# Patient Record
Sex: MALE | Race: WHITE | Employment: PART TIME | URBAN - METROPOLITAN AREA
[De-identification: names, ages, dates, MRNs, and addresses within clinical notes are randomized per-mention and may not be internally consistent; named-entity substitution may affect disease eponyms.]

---

## 2017-01-06 ENCOUNTER — LAB REQUISITION (OUTPATIENT)
Dept: LAB | Facility: HOSPITAL | Age: 72
End: 2017-01-06

## 2017-01-06 DIAGNOSIS — Z00.01 ENCOUNTER FOR GENERAL ADULT MEDICAL EXAMINATION WITH ABNORMAL FINDINGS: ICD-10-CM

## 2017-01-06 LAB
25(OH)D3 SERPL-MCNC: 28.6 NG/ML
ALBUMIN SERPL BCP-MCNC: 3.9 G/DL (ref 3.5–4.8)
ALBUMIN/GLOB SERPL: 1.3 {RATIO} (ref 1–2)
ALP SERPL-CCNC: 64 U/L (ref 32–100)
ALT SERPL-CCNC: 24 U/L (ref 17–63)
ANION GAP SERPL CALC-SCNC: 6 MMOL/L (ref 0–18)
AST SERPL-CCNC: 25 U/L (ref 15–41)
BASOPHILS # BLD: 0.1 K/UL (ref 0–0.2)
BASOPHILS NFR BLD: 1 %
BILIRUB SERPL-MCNC: 0.8 MG/DL (ref 0.3–1.2)
BUN SERPL-MCNC: 18 MG/DL (ref 8–20)
BUN/CREAT SERPL: 22.8 (ref 10–20)
CALCIUM SERPL-MCNC: 9.5 MG/DL (ref 8.5–10.5)
CHLORIDE SERPL-SCNC: 106 MMOL/L (ref 95–110)
CHOLEST SERPL-MCNC: 184 MG/DL (ref 110–200)
CO2 SERPL-SCNC: 29 MMOL/L (ref 22–32)
CREAT SERPL-MCNC: 0.79 MG/DL (ref 0.5–1.5)
EOSINOPHIL # BLD: 0.2 K/UL (ref 0–0.7)
EOSINOPHIL NFR BLD: 3 %
ERYTHROCYTE [DISTWIDTH] IN BLOOD BY AUTOMATED COUNT: 13.8 % (ref 11–15)
GLOBULIN PLAS-MCNC: 2.9 G/DL (ref 2.5–3.7)
GLUCOSE SERPL-MCNC: 109 MG/DL (ref 70–99)
HCT VFR BLD AUTO: 46.6 % (ref 41–52)
HDLC SERPL-MCNC: 55 MG/DL
HGB BLD-MCNC: 15.5 G/DL (ref 13.5–17.5)
LDLC SERPL CALC-MCNC: 118 MG/DL (ref 0–99)
LYMPHOCYTES # BLD: 1.7 K/UL (ref 1–4)
LYMPHOCYTES NFR BLD: 25 %
MCH RBC QN AUTO: 29.4 PG (ref 27–32)
MCHC RBC AUTO-ENTMCNC: 33.3 G/DL (ref 32–37)
MCV RBC AUTO: 88.1 FL (ref 80–100)
MONOCYTES # BLD: 0.6 K/UL (ref 0–1)
MONOCYTES NFR BLD: 9 %
NEUTROPHILS # BLD AUTO: 4.2 K/UL (ref 1.8–7.7)
NEUTROPHILS NFR BLD: 62 %
NONHDLC SERPL-MCNC: 129 MG/DL
OSMOLALITY UR CALC.SUM OF ELEC: 294 MOSM/KG (ref 275–295)
PLATELET # BLD AUTO: 180 K/UL (ref 140–400)
PMV BLD AUTO: 9.3 FL (ref 7.4–10.3)
POTASSIUM SERPL-SCNC: 4.5 MMOL/L (ref 3.3–5.1)
PROT SERPL-MCNC: 6.8 G/DL (ref 5.9–8.4)
PSA SERPL-MCNC: 2.6 NG/ML (ref 0–4)
RBC # BLD AUTO: 5.28 M/UL (ref 4.5–5.9)
SODIUM SERPL-SCNC: 141 MMOL/L (ref 136–144)
TRIGL SERPL-MCNC: 56 MG/DL (ref 1–149)
TSH SERPL-ACNC: 1.9 UIU/ML (ref 0.34–5.6)
WBC # BLD AUTO: 6.7 K/UL (ref 4–11)

## 2017-01-06 PROCEDURE — 84443 ASSAY THYROID STIM HORMONE: CPT | Performed by: INTERNAL MEDICINE

## 2017-01-06 PROCEDURE — 80053 COMPREHEN METABOLIC PANEL: CPT | Performed by: INTERNAL MEDICINE

## 2017-01-06 PROCEDURE — 82306 VITAMIN D 25 HYDROXY: CPT | Performed by: INTERNAL MEDICINE

## 2017-01-06 PROCEDURE — 80061 LIPID PANEL: CPT | Performed by: INTERNAL MEDICINE

## 2017-01-06 PROCEDURE — 85025 COMPLETE CBC W/AUTO DIFF WBC: CPT | Performed by: INTERNAL MEDICINE

## 2017-02-28 ENCOUNTER — HOSPITAL ENCOUNTER (OUTPATIENT)
Dept: CT IMAGING | Age: 72
Discharge: HOME | End: 2017-02-28
Attending: INTERNAL MEDICINE

## 2017-02-28 VITALS — SYSTOLIC BLOOD PRESSURE: 135 MMHG | HEART RATE: 55 BPM | DIASTOLIC BLOOD PRESSURE: 53 MMHG

## 2017-02-28 DIAGNOSIS — Z13.9 ENCOUNTER FOR SCREENING: ICD-10-CM

## 2017-02-28 NOTE — IMAGING NOTE
Cholesterol/Glucose screening performed: total cholesterol 166, HDL 51, triglycerides 80, LDL 99, fasting glucose 79. Results discussed with Mr. Helene Portillo who verbalized understanding. Preliminary results for CT Calcium Score: 4.79.   Risks and findings d

## 2017-12-22 ENCOUNTER — HOSPITAL ENCOUNTER (OUTPATIENT)
Dept: MRI IMAGING | Facility: HOSPITAL | Age: 72
Discharge: HOME | End: 2017-12-22
Attending: ORTHOPAEDIC SURGERY
Payer: COMMERCIAL

## 2017-12-22 DIAGNOSIS — M75.102 UNSPECIFIED ROTATOR CUFF TEAR OR RUPTURE OF LEFT SHOULDER, NOT SPECIFIED AS TRAUMATIC: ICD-10-CM

## 2017-12-22 DIAGNOSIS — M75.42 IMPINGEMENT SYNDROME, SHOULDER, LEFT: ICD-10-CM

## 2017-12-22 PROCEDURE — 73221 MRI JOINT UPR EXTREM W/O DYE: CPT | Performed by: ORTHOPAEDIC SURGERY

## 2018-01-06 ENCOUNTER — LAB ENCOUNTER (OUTPATIENT)
Dept: LAB | Facility: HOSPITAL | Age: 73
End: 2018-01-06
Attending: UROLOGY
Payer: COMMERCIAL

## 2018-01-06 ENCOUNTER — HOSPITAL ENCOUNTER (OUTPATIENT)
Dept: ULTRASOUND IMAGING | Facility: HOSPITAL | Age: 73
Discharge: HOME | End: 2018-01-06
Attending: UROLOGY
Payer: COMMERCIAL

## 2018-01-06 DIAGNOSIS — Z12.5 SCREENING FOR PROSTATE CANCER: ICD-10-CM

## 2018-01-06 DIAGNOSIS — N13.9 OBSTRUCTED, UROPATHY: ICD-10-CM

## 2018-01-06 DIAGNOSIS — Z00.01 ENCOUNTER FOR GENERAL ADULT MEDICAL EXAMINATION WITH ABNORMAL FINDINGS: Primary | ICD-10-CM

## 2018-01-06 LAB
ALBUMIN SERPL BCP-MCNC: 3.9 G/DL (ref 3.5–4.8)
ALBUMIN/GLOB SERPL: 1.4 {RATIO} (ref 1–2)
ALP SERPL-CCNC: 54 U/L (ref 32–100)
ALT SERPL-CCNC: 28 U/L (ref 17–63)
ANION GAP SERPL CALC-SCNC: 7 MMOL/L (ref 0–18)
AST SERPL-CCNC: 28 U/L (ref 15–41)
BASOPHILS # BLD: 0.1 K/UL (ref 0–0.2)
BASOPHILS NFR BLD: 1 %
BILIRUB SERPL-MCNC: 0.9 MG/DL (ref 0.3–1.2)
BILIRUB UR QL: NEGATIVE
BUN SERPL-MCNC: 19 MG/DL (ref 8–20)
BUN/CREAT SERPL: 18.6 (ref 10–20)
CALCIUM SERPL-MCNC: 9.5 MG/DL (ref 8.5–10.5)
CHLORIDE SERPL-SCNC: 105 MMOL/L (ref 95–110)
CHOLEST SERPL-MCNC: 182 MG/DL (ref 110–200)
CLARITY UR: CLEAR
CO2 SERPL-SCNC: 28 MMOL/L (ref 22–32)
COLOR UR: YELLOW
CREAT SERPL-MCNC: 1.02 MG/DL (ref 0.5–1.5)
EOSINOPHIL # BLD: 0.1 K/UL (ref 0–0.7)
EOSINOPHIL NFR BLD: 2 %
ERYTHROCYTE [DISTWIDTH] IN BLOOD BY AUTOMATED COUNT: 14.1 % (ref 11–15)
GLOBULIN PLAS-MCNC: 2.8 G/DL (ref 2.5–3.7)
GLUCOSE SERPL-MCNC: 94 MG/DL (ref 70–99)
GLUCOSE UR-MCNC: NEGATIVE MG/DL
HBA1C MFR BLD: 5.6 % (ref 4–6)
HCT VFR BLD AUTO: 46.4 % (ref 41–52)
HDLC SERPL-MCNC: 54 MG/DL
HGB BLD-MCNC: 15.5 G/DL (ref 13.5–17.5)
HGB UR QL STRIP.AUTO: NEGATIVE
KETONES UR-MCNC: NEGATIVE MG/DL
LDLC SERPL CALC-MCNC: 109 MG/DL (ref 0–99)
LEUKOCYTE ESTERASE UR QL STRIP.AUTO: NEGATIVE
LYMPHOCYTES # BLD: 1.7 K/UL (ref 1–4)
LYMPHOCYTES NFR BLD: 27 %
MCH RBC QN AUTO: 29.5 PG (ref 27–32)
MCHC RBC AUTO-ENTMCNC: 33.4 G/DL (ref 32–37)
MCV RBC AUTO: 88.1 FL (ref 80–100)
MONOCYTES # BLD: 0.5 K/UL (ref 0–1)
MONOCYTES NFR BLD: 8 %
NEUTROPHILS # BLD AUTO: 3.9 K/UL (ref 1.8–7.7)
NEUTROPHILS NFR BLD: 62 %
NITRITE UR QL STRIP.AUTO: NEGATIVE
NONHDLC SERPL-MCNC: 128 MG/DL
OSMOLALITY UR CALC.SUM OF ELEC: 292 MOSM/KG (ref 275–295)
PH UR: 6 [PH] (ref 5–8)
PLATELET # BLD AUTO: 171 K/UL (ref 140–400)
PMV BLD AUTO: 9.3 FL (ref 7.4–10.3)
POTASSIUM SERPL-SCNC: 4.5 MMOL/L (ref 3.3–5.1)
PROT SERPL-MCNC: 6.7 G/DL (ref 5.9–8.4)
PROT UR-MCNC: NEGATIVE MG/DL
PSA SERPL-MCNC: 3.9 NG/ML (ref 0–4)
RBC # BLD AUTO: 5.26 M/UL (ref 4.5–5.9)
SODIUM SERPL-SCNC: 140 MMOL/L (ref 136–144)
SP GR UR STRIP: 1.01 (ref 1–1.03)
TRIGL SERPL-MCNC: 95 MG/DL (ref 1–149)
TSH SERPL-ACNC: 1.51 UIU/ML (ref 0.45–5.33)
UROBILINOGEN UR STRIP-ACNC: <2
VIT C UR-MCNC: NEGATIVE MG/DL
WBC # BLD AUTO: 6.3 K/UL (ref 4–11)

## 2018-01-06 PROCEDURE — 80053 COMPREHEN METABOLIC PANEL: CPT

## 2018-01-06 PROCEDURE — 80061 LIPID PANEL: CPT

## 2018-01-06 PROCEDURE — 85025 COMPLETE CBC W/AUTO DIFF WBC: CPT

## 2018-01-06 PROCEDURE — 84443 ASSAY THYROID STIM HORMONE: CPT

## 2018-01-06 PROCEDURE — 81003 URINALYSIS AUTO W/O SCOPE: CPT

## 2018-01-06 PROCEDURE — 83036 HEMOGLOBIN GLYCOSYLATED A1C: CPT

## 2018-01-06 PROCEDURE — 82306 VITAMIN D 25 HYDROXY: CPT

## 2018-01-06 PROCEDURE — 36415 COLL VENOUS BLD VENIPUNCTURE: CPT

## 2018-01-06 PROCEDURE — 86803 HEPATITIS C AB TEST: CPT

## 2018-01-06 PROCEDURE — 76857 US EXAM PELVIC LIMITED: CPT | Performed by: UROLOGY

## 2018-01-08 LAB — HCV AB SERPL QL IA: NONREACTIVE

## 2018-01-09 LAB — 25(OH)D3 SERPL-MCNC: 42.6 NG/ML

## 2018-02-05 ENCOUNTER — HOSPITAL ENCOUNTER (OUTPATIENT)
Dept: MAMMOGRAPHY | Facility: HOSPITAL | Age: 73
Discharge: HOME | End: 2018-02-05
Attending: INTERNAL MEDICINE
Payer: COMMERCIAL

## 2018-02-05 ENCOUNTER — HOSPITAL ENCOUNTER (OUTPATIENT)
Dept: ULTRASOUND IMAGING | Facility: HOSPITAL | Age: 73
Discharge: HOME | End: 2018-02-05
Attending: INTERNAL MEDICINE
Payer: COMMERCIAL

## 2018-02-05 DIAGNOSIS — N63.20 LEFT BREAST LUMP: ICD-10-CM

## 2018-02-05 PROCEDURE — 76642 ULTRASOUND BREAST LIMITED: CPT | Performed by: INTERNAL MEDICINE

## 2018-02-05 PROCEDURE — 77066 DX MAMMO INCL CAD BI: CPT | Performed by: INTERNAL MEDICINE

## 2019-01-17 ENCOUNTER — LAB ENCOUNTER (OUTPATIENT)
Dept: LAB | Facility: HOSPITAL | Age: 74
End: 2019-01-17
Attending: INTERNAL MEDICINE
Payer: COMMERCIAL

## 2019-01-17 DIAGNOSIS — E78.00 PURE HYPERCHOLESTEROLEMIA: ICD-10-CM

## 2019-01-17 DIAGNOSIS — R73.01 IMPAIRED FASTING GLUCOSE: ICD-10-CM

## 2019-01-17 DIAGNOSIS — Z12.5 ENCOUNTER FOR SCREENING FOR MALIGNANT NEOPLASM OF PROSTATE: ICD-10-CM

## 2019-01-17 DIAGNOSIS — Z00.00 ENCOUNTER FOR GENERAL ADULT MEDICAL EXAMINATION W/O ABNORMAL FINDINGS: Primary | ICD-10-CM

## 2019-01-17 LAB
ALBUMIN SERPL BCP-MCNC: 3.9 G/DL (ref 3.5–4.8)
ALBUMIN/GLOB SERPL: 1.3 {RATIO} (ref 1–2)
ALP SERPL-CCNC: 62 U/L (ref 32–100)
ALT SERPL-CCNC: 33 U/L (ref 17–63)
ANION GAP SERPL CALC-SCNC: 9 MMOL/L (ref 0–18)
AST SERPL-CCNC: 33 U/L (ref 15–41)
BASOPHILS # BLD: 0 K/UL (ref 0–0.2)
BASOPHILS NFR BLD: 1 %
BILIRUB SERPL-MCNC: 0.7 MG/DL (ref 0.3–1.2)
BILIRUB UR QL: NEGATIVE
BUN SERPL-MCNC: 19 MG/DL (ref 8–20)
BUN/CREAT SERPL: 20.7 (ref 10–20)
CALCIUM SERPL-MCNC: 9.7 MG/DL (ref 8.5–10.5)
CHLORIDE SERPL-SCNC: 103 MMOL/L (ref 95–110)
CHOLEST SERPL-MCNC: 177 MG/DL (ref 110–200)
CLARITY UR: CLEAR
CO2 SERPL-SCNC: 26 MMOL/L (ref 22–32)
COLOR UR: YELLOW
COMPLEXED PSA SERPL-MCNC: 4.53 NG/ML (ref 0.01–4)
CREAT SERPL-MCNC: 0.92 MG/DL (ref 0.5–1.5)
CREAT UR-MCNC: 37.8 MG/DL
EOSINOPHIL # BLD: 0.2 K/UL (ref 0–0.7)
EOSINOPHIL NFR BLD: 3 %
ERYTHROCYTE [DISTWIDTH] IN BLOOD BY AUTOMATED COUNT: 13.9 % (ref 11–15)
EST. AVERAGE GLUCOSE BLD GHB EST-MCNC: 111 MG/DL (ref 68–126)
GLOBULIN PLAS-MCNC: 3.1 G/DL (ref 2.5–3.7)
GLUCOSE SERPL-MCNC: 94 MG/DL (ref 70–99)
GLUCOSE UR-MCNC: NEGATIVE MG/DL
HBA1C MFR BLD HPLC: 5.5 % (ref ?–5.7)
HCT VFR BLD AUTO: 46.8 % (ref 41–52)
HDLC SERPL-MCNC: 65 MG/DL
HGB BLD-MCNC: 15.9 G/DL (ref 13.5–17.5)
HGB UR QL STRIP.AUTO: NEGATIVE
KETONES UR-MCNC: NEGATIVE MG/DL
LDLC SERPL CALC-MCNC: 97 MG/DL (ref 0–99)
LEUKOCYTE ESTERASE UR QL STRIP.AUTO: NEGATIVE
LYMPHOCYTES # BLD: 1.6 K/UL (ref 1–4)
LYMPHOCYTES NFR BLD: 27 %
MCH RBC QN AUTO: 29.8 PG (ref 27–32)
MCHC RBC AUTO-ENTMCNC: 33.9 G/DL (ref 32–37)
MCV RBC AUTO: 87.9 FL (ref 80–100)
MICROALBUMIN UR-MCNC: 0.5 MG/DL (ref 0–1.8)
MICROALBUMIN/CREAT UR: 13.2 MG/G{CREAT} (ref 0–20)
MONOCYTES # BLD: 0.5 K/UL (ref 0–1)
MONOCYTES NFR BLD: 9 %
NEUTROPHILS # BLD AUTO: 3.7 K/UL (ref 1.8–7.7)
NEUTROPHILS NFR BLD: 62 %
NITRITE UR QL STRIP.AUTO: NEGATIVE
NONHDLC SERPL-MCNC: 112 MG/DL
OSMOLALITY UR CALC.SUM OF ELEC: 288 MOSM/KG (ref 275–295)
PATIENT FASTING: YES
PH UR: 6 [PH] (ref 5–8)
PLATELET # BLD AUTO: 194 K/UL (ref 140–400)
PMV BLD AUTO: 9.3 FL (ref 7.4–10.3)
POTASSIUM SERPL-SCNC: 4.4 MMOL/L (ref 3.3–5.1)
PROT SERPL-MCNC: 7 G/DL (ref 5.9–8.4)
PROT UR-MCNC: NEGATIVE MG/DL
PSA SERPL-MCNC: 4 NG/ML (ref 0–4)
RBC # BLD AUTO: 5.32 M/UL (ref 4.5–5.9)
SODIUM SERPL-SCNC: 138 MMOL/L (ref 136–144)
SP GR UR STRIP: 1.01 (ref 1–1.03)
TRIGL SERPL-MCNC: 73 MG/DL (ref 1–149)
TSH SERPL-ACNC: 1.54 UIU/ML (ref 0.45–5.33)
UROBILINOGEN UR STRIP-ACNC: <2
VIT C UR-MCNC: NEGATIVE MG/DL
WBC # BLD AUTO: 6 K/UL (ref 4–11)

## 2019-01-17 PROCEDURE — 81003 URINALYSIS AUTO W/O SCOPE: CPT

## 2019-01-17 PROCEDURE — 85025 COMPLETE CBC W/AUTO DIFF WBC: CPT

## 2019-01-17 PROCEDURE — 82570 ASSAY OF URINE CREATININE: CPT

## 2019-01-17 PROCEDURE — 80053 COMPREHEN METABOLIC PANEL: CPT

## 2019-01-17 PROCEDURE — 36415 COLL VENOUS BLD VENIPUNCTURE: CPT

## 2019-01-17 PROCEDURE — 83036 HEMOGLOBIN GLYCOSYLATED A1C: CPT

## 2019-01-17 PROCEDURE — 80061 LIPID PANEL: CPT

## 2019-01-17 PROCEDURE — 84443 ASSAY THYROID STIM HORMONE: CPT

## 2019-01-17 PROCEDURE — 82043 UR ALBUMIN QUANTITATIVE: CPT

## 2019-07-08 ENCOUNTER — HOSPITAL ENCOUNTER (OUTPATIENT)
Age: 74
Discharge: HOME | End: 2019-07-08
Attending: EMERGENCY MEDICINE
Payer: COMMERCIAL

## 2019-07-08 VITALS
WEIGHT: 253 LBS | TEMPERATURE: 98 F | SYSTOLIC BLOOD PRESSURE: 146 MMHG | BODY MASS INDEX: 42 KG/M2 | RESPIRATION RATE: 18 BRPM | DIASTOLIC BLOOD PRESSURE: 82 MMHG | OXYGEN SATURATION: 95 % | HEART RATE: 56 BPM

## 2019-07-08 DIAGNOSIS — T15.91XA: Primary | ICD-10-CM

## 2019-07-08 PROCEDURE — 99213 OFFICE O/P EST LOW 20 MIN: CPT

## 2019-07-08 PROCEDURE — 99203 OFFICE O/P NEW LOW 30 MIN: CPT

## 2019-07-08 RX ORDER — ERYTHROMYCIN 5 MG/G
1 OINTMENT OPHTHALMIC EVERY 6 HOURS
Qty: 1 G | Refills: 0 | Status: SHIPPED | OUTPATIENT
Start: 2019-07-08 | End: 2019-07-15

## 2019-07-09 NOTE — ED PROVIDER NOTES
Patient Seen in: [facility] In [address] [place] [address]    History   Patient presents with: Eye Visual Problem (opthalmic)    Stated Complaint: rt eye FB    HPI    Pt complains of righteye pain, redness for 1 days. Pain is stinging,.   Hurts to bli Packs/day: 1.00        Years: 30.00        Pack years: 27        Quit date: 6/15/1989        Years since quittin.0      Smokeless tobacco: Never Used    Alcohol use:  Yes      Alcohol/week: 1.5 oz      Types: 3 Glasses of wine per week    Drug use: Not

## 2019-07-09 NOTE — ED INITIAL ASSESSMENT (HPI)
Possible wood saw dust in the right eye. Pt was working in his basement and was blowing the sawdust.  Pt states this occurred two days ago. Pt washed out his eye, but still feels like there is something in his right eye.

## 2019-07-16 ENCOUNTER — HOSPITAL ENCOUNTER (OUTPATIENT)
Dept: ULTRASOUND IMAGING | Facility: HOSPITAL | Age: 74
Discharge: HOME | End: 2019-07-16
Attending: UROLOGY
Payer: COMMERCIAL

## 2019-07-16 ENCOUNTER — LAB ENCOUNTER (OUTPATIENT)
Dept: LAB | Facility: HOSPITAL | Age: 74
End: 2019-07-16
Attending: UROLOGY
Payer: COMMERCIAL

## 2019-07-16 DIAGNOSIS — N13.9 ACUTE UNILATERAL OBSTRUCTIVE UROPATHY: Primary | ICD-10-CM

## 2019-07-16 DIAGNOSIS — R97.20 ELEVATED PROSTATE SPECIFIC ANTIGEN (PSA): ICD-10-CM

## 2019-07-16 DIAGNOSIS — R97.20 ELEVATED PSA: ICD-10-CM

## 2019-07-16 DIAGNOSIS — N13.9 OBSTRUCTIVE UROPATHY: ICD-10-CM

## 2019-07-16 LAB — PSA SERPL-MCNC: 2.38 NG/ML (ref ?–4)

## 2019-07-16 PROCEDURE — 36415 COLL VENOUS BLD VENIPUNCTURE: CPT

## 2019-07-16 PROCEDURE — 76857 US EXAM PELVIC LIMITED: CPT | Performed by: UROLOGY

## 2019-07-16 PROCEDURE — 84153 ASSAY OF PSA TOTAL: CPT

## 2019-10-28 ENCOUNTER — HOSPITAL ENCOUNTER (OUTPATIENT)
Dept: GENERAL RADIOLOGY | Facility: HOSPITAL | Age: 74
Discharge: HOME | End: 2019-10-28
Attending: INTERNAL MEDICINE
Payer: COMMERCIAL

## 2019-10-28 ENCOUNTER — LAB ENCOUNTER (OUTPATIENT)
Dept: LAB | Facility: HOSPITAL | Age: 74
End: 2019-10-28
Attending: INTERNAL MEDICINE
Payer: COMMERCIAL

## 2019-10-28 DIAGNOSIS — M25.531 PAIN IN BOTH WRISTS: ICD-10-CM

## 2019-10-28 DIAGNOSIS — M79.641 HAND PAIN, RIGHT: ICD-10-CM

## 2019-10-28 DIAGNOSIS — M25.539 WRIST PAIN: ICD-10-CM

## 2019-10-28 DIAGNOSIS — M25.532 PAIN IN BOTH WRISTS: ICD-10-CM

## 2019-10-28 DIAGNOSIS — M79.642 PAIN IN BOTH HANDS: Primary | ICD-10-CM

## 2019-10-28 DIAGNOSIS — M79.641 PAIN IN BOTH HANDS: Primary | ICD-10-CM

## 2019-10-28 DIAGNOSIS — M79.642 HAND PAIN, LEFT: ICD-10-CM

## 2019-10-28 DIAGNOSIS — M25.531 WRIST PAIN, RIGHT: ICD-10-CM

## 2019-10-28 PROCEDURE — 73130 X-RAY EXAM OF HAND: CPT | Performed by: INTERNAL MEDICINE

## 2019-10-28 PROCEDURE — 86038 ANTINUCLEAR ANTIBODIES: CPT

## 2019-10-28 PROCEDURE — 73110 X-RAY EXAM OF WRIST: CPT | Performed by: INTERNAL MEDICINE

## 2019-10-28 PROCEDURE — 86431 RHEUMATOID FACTOR QUANT: CPT

## 2019-10-28 PROCEDURE — 86140 C-REACTIVE PROTEIN: CPT

## 2019-10-28 PROCEDURE — 86039 ANTINUCLEAR ANTIBODIES (ANA): CPT

## 2019-10-28 PROCEDURE — 36415 COLL VENOUS BLD VENIPUNCTURE: CPT

## 2019-10-28 PROCEDURE — 86235 NUCLEAR ANTIGEN ANTIBODY: CPT

## 2019-10-28 PROCEDURE — 85652 RBC SED RATE AUTOMATED: CPT

## 2019-10-28 PROCEDURE — 84550 ASSAY OF BLOOD/URIC ACID: CPT

## 2019-10-28 PROCEDURE — 86225 DNA ANTIBODY NATIVE: CPT

## 2019-10-28 PROCEDURE — 86200 CCP ANTIBODY: CPT

## 2020-01-06 ENCOUNTER — LAB ENCOUNTER (OUTPATIENT)
Dept: LAB | Age: 75
End: 2020-01-06
Attending: INTERNAL MEDICINE
Payer: COMMERCIAL

## 2020-01-06 DIAGNOSIS — Z00.01 ENCOUNTER FOR GENERAL ADULT MEDICAL EXAMINATION WITH ABNORMAL FINDINGS: Primary | ICD-10-CM

## 2020-01-06 DIAGNOSIS — R97.20 ELEVATED PROSTATE SPECIFIC ANTIGEN (PSA): ICD-10-CM

## 2020-01-06 LAB
ALBUMIN SERPL-MCNC: 3.8 G/DL (ref 3.4–5)
ALBUMIN/GLOB SERPL: 1.2 {RATIO} (ref 1–2)
ALP LIVER SERPL-CCNC: 74 U/L (ref 45–117)
ALT SERPL-CCNC: 36 U/L (ref 16–61)
ANION GAP SERPL CALC-SCNC: 6 MMOL/L (ref 0–18)
AST SERPL-CCNC: 35 U/L (ref 15–37)
BASOPHILS # BLD AUTO: 0.09 X10(3) UL (ref 0–0.2)
BASOPHILS NFR BLD AUTO: 1.3 %
BILIRUB SERPL-MCNC: 0.6 MG/DL (ref 0.1–2)
BILIRUB UR QL: NEGATIVE
BUN BLD-MCNC: 15 MG/DL (ref 7–18)
BUN/CREAT SERPL: 13.8 (ref 10–20)
CALCIUM BLD-MCNC: 9.9 MG/DL (ref 8.5–10.1)
CHLORIDE SERPL-SCNC: 105 MMOL/L (ref 98–112)
CHOLEST SMN-MCNC: 160 MG/DL (ref ?–200)
CLARITY UR: CLEAR
CO2 SERPL-SCNC: 30 MMOL/L (ref 21–32)
COLOR UR: YELLOW
CREAT BLD-MCNC: 1.09 MG/DL (ref 0.7–1.3)
DEPRECATED RDW RBC AUTO: 42.7 FL (ref 35.1–46.3)
EOSINOPHIL # BLD AUTO: 0.26 X10(3) UL (ref 0–0.7)
EOSINOPHIL NFR BLD AUTO: 3.8 %
ERYTHROCYTE [DISTWIDTH] IN BLOOD BY AUTOMATED COUNT: 12.9 % (ref 11–15)
EST. AVERAGE GLUCOSE BLD GHB EST-MCNC: 111 MG/DL (ref 68–126)
GLOBULIN PLAS-MCNC: 3.1 G/DL (ref 2.8–4.4)
GLUCOSE BLD-MCNC: 87 MG/DL (ref 70–99)
GLUCOSE UR-MCNC: NEGATIVE MG/DL
HBA1C MFR BLD HPLC: 5.5 % (ref ?–5.7)
HCT VFR BLD AUTO: 47.7 % (ref 39–53)
HDLC SERPL-MCNC: 45 MG/DL (ref 40–59)
HGB BLD-MCNC: 15.4 G/DL (ref 13–17.5)
HGB UR QL STRIP.AUTO: NEGATIVE
IMM GRANULOCYTES # BLD AUTO: 0.01 X10(3) UL (ref 0–1)
IMM GRANULOCYTES NFR BLD: 0.1 %
KETONES UR-MCNC: NEGATIVE MG/DL
LDLC SERPL CALC-MCNC: 100 MG/DL (ref ?–100)
LEUKOCYTE ESTERASE UR QL STRIP.AUTO: NEGATIVE
LYMPHOCYTES # BLD AUTO: 1.68 X10(3) UL (ref 1–4)
LYMPHOCYTES NFR BLD AUTO: 24.5 %
M PROTEIN MFR SERPL ELPH: 6.9 G/DL (ref 6.4–8.2)
MCH RBC QN AUTO: 29.4 PG (ref 26–34)
MCHC RBC AUTO-ENTMCNC: 32.3 G/DL (ref 31–37)
MCV RBC AUTO: 91 FL (ref 80–100)
MONOCYTES # BLD AUTO: 0.52 X10(3) UL (ref 0.1–1)
MONOCYTES NFR BLD AUTO: 7.6 %
NEUTROPHILS # BLD AUTO: 4.31 X10 (3) UL (ref 1.5–7.7)
NEUTROPHILS # BLD AUTO: 4.31 X10(3) UL (ref 1.5–7.7)
NEUTROPHILS NFR BLD AUTO: 62.7 %
NITRITE UR QL STRIP.AUTO: NEGATIVE
NONHDLC SERPL-MCNC: 115 MG/DL (ref ?–130)
OSMOLALITY SERPL CALC.SUM OF ELEC: 292 MOSM/KG (ref 275–295)
PATIENT FASTING Y/N/NP: YES
PATIENT FASTING Y/N/NP: YES
PH UR: 7 [PH] (ref 5–8)
PLATELET # BLD AUTO: 202 10(3)UL (ref 150–450)
POTASSIUM SERPL-SCNC: 4.7 MMOL/L (ref 3.5–5.1)
PROT UR-MCNC: NEGATIVE MG/DL
PSA FREE MFR SERPL: 13 %
PSA FREE SERPL-MCNC: 0.27 NG/ML
PSA SERPL-MCNC: 2.01 NG/ML (ref ?–4)
RBC # BLD AUTO: 5.24 X10(6)UL (ref 3.8–5.8)
SODIUM SERPL-SCNC: 141 MMOL/L (ref 136–145)
SP GR UR STRIP: 1.01 (ref 1–1.03)
TRIGL SERPL-MCNC: 74 MG/DL (ref 30–149)
TSI SER-ACNC: 2.28 MIU/ML (ref 0.36–3.74)
UROBILINOGEN UR STRIP-ACNC: <2
VLDLC SERPL CALC-MCNC: 15 MG/DL (ref 0–30)
WBC # BLD AUTO: 6.9 X10(3) UL (ref 4–11)

## 2020-01-06 PROCEDURE — 80053 COMPREHEN METABOLIC PANEL: CPT

## 2020-01-06 PROCEDURE — 84154 ASSAY OF PSA FREE: CPT

## 2020-01-06 PROCEDURE — 85025 COMPLETE CBC W/AUTO DIFF WBC: CPT

## 2020-01-06 PROCEDURE — 36415 COLL VENOUS BLD VENIPUNCTURE: CPT

## 2020-01-06 PROCEDURE — 83036 HEMOGLOBIN GLYCOSYLATED A1C: CPT

## 2020-01-06 PROCEDURE — 84153 ASSAY OF PSA TOTAL: CPT

## 2020-01-06 PROCEDURE — 80061 LIPID PANEL: CPT

## 2020-01-06 PROCEDURE — 81003 URINALYSIS AUTO W/O SCOPE: CPT

## 2020-01-06 PROCEDURE — 84443 ASSAY THYROID STIM HORMONE: CPT

## 2020-07-20 ENCOUNTER — OFFICE VISIT (OUTPATIENT)
Dept: RHEUMATOLOGY | Facility: CLINIC | Age: 75
End: 2020-07-20
Payer: COMMERCIAL

## 2020-07-20 VITALS
TEMPERATURE: 98 F | BODY MASS INDEX: 41.48 KG/M2 | HEIGHT: 65 IN | SYSTOLIC BLOOD PRESSURE: 128 MMHG | WEIGHT: 249 LBS | DIASTOLIC BLOOD PRESSURE: 72 MMHG | RESPIRATION RATE: 20 BRPM | HEART RATE: 64 BPM

## 2020-07-20 DIAGNOSIS — M19.041 OSTEOARTHRITIS OF FINGERS OF BOTH HANDS: Primary | ICD-10-CM

## 2020-07-20 DIAGNOSIS — M11.262 CHONDROCALCINOSIS OF BOTH KNEES: ICD-10-CM

## 2020-07-20 DIAGNOSIS — Z98.890 STATUS POST LUMBAR LAMINECTOMY: ICD-10-CM

## 2020-07-20 DIAGNOSIS — M11.261 CHONDROCALCINOSIS OF BOTH KNEES: ICD-10-CM

## 2020-07-20 DIAGNOSIS — M19.042 OSTEOARTHRITIS OF FINGERS OF BOTH HANDS: Primary | ICD-10-CM

## 2020-07-20 DIAGNOSIS — E66.01 CLASS 3 SEVERE OBESITY DUE TO EXCESS CALORIES WITHOUT SERIOUS COMORBIDITY WITH BODY MASS INDEX (BMI) OF 40.0 TO 44.9 IN ADULT (HCC): ICD-10-CM

## 2020-07-20 DIAGNOSIS — Z87.39 H/O CALCIUM PYROPHOSPHATE DEPOSITION DISEASE (CPPD): ICD-10-CM

## 2020-07-20 DIAGNOSIS — M18.0 OSTEOARTHRITIS OF CARPOMETACARPAL (CMC) JOINT OF BOTH THUMBS: ICD-10-CM

## 2020-07-20 PROBLEM — E66.813 CLASS 3 SEVERE OBESITY DUE TO EXCESS CALORIES WITHOUT SERIOUS COMORBIDITY WITH BODY MASS INDEX (BMI) OF 40.0 TO 44.9 IN ADULT: Status: ACTIVE | Noted: 2020-07-20

## 2020-07-20 PROBLEM — E66.813 CLASS 3 SEVERE OBESITY DUE TO EXCESS CALORIES WITHOUT SERIOUS COMORBIDITY WITH BODY MASS INDEX (BMI) OF 40.0 TO 44.9 IN ADULT (HCC): Status: ACTIVE | Noted: 2020-07-20

## 2020-07-20 PROCEDURE — 99204 OFFICE O/P NEW MOD 45 MIN: CPT | Performed by: INTERNAL MEDICINE

## 2020-07-20 PROCEDURE — 3008F BODY MASS INDEX DOCD: CPT | Performed by: INTERNAL MEDICINE

## 2020-07-20 PROCEDURE — 3074F SYST BP LT 130 MM HG: CPT | Performed by: INTERNAL MEDICINE

## 2020-07-20 PROCEDURE — 3078F DIAST BP <80 MM HG: CPT | Performed by: INTERNAL MEDICINE

## 2020-07-20 RX ORDER — COLCHICINE 0.6 MG/1
0.6 TABLET ORAL 2 TIMES DAILY
Qty: 180 TABLET | Refills: 3 | Status: SHIPPED | OUTPATIENT
Start: 2020-07-20 | End: 2021-01-06

## 2020-07-20 RX ORDER — COLCHICINE 0.6 MG/1
0.6 TABLET ORAL 2 TIMES DAILY
COMMUNITY
Start: 2020-02-26 | End: 2021-01-06

## 2020-07-20 NOTE — PATIENT INSTRUCTIONS
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 CPPD disease stands for calcium pyrophosphate deposition disease, calcium crystals, crystals that causes pseudogout.   This is a pain

## 2020-07-20 NOTE — PROGRESS NOTES
[facility]  Report of Consultation    Nieves Clay Patient Status:  No patient class for patient encounter    1945 MRN [id number]   Location [address] [place] [address] PCP Katelyn Johnston MD     Date of Consult:  20    Reason for Consulta tablet (0.6 mg total) by mouth 2 (two) times daily. , Disp: 180 tablet, Rfl: 3  •  Finasteride (PROSCAR OR), Take by mouth., Disp: , Rfl:   •  triamcinolone acetonide 0.1 % External Cream, Apply topically 2 (two) times daily. , Disp: , Rfl:   •  Vitamin D3 2 limits. Laboratory Data: 10/28/2012 HEATH screen positive. C-reactive protein slightly +0.66 normal is less than 0.3. Rheumatoid factor negative. Uric acid 8.6. CCP antibody negative. PSA 2.38.     Imagin2019 x-ray wrists/hands bilaterally sh for calcium pyrophosphate deposition disease, calcium crystals, crystals that causes pseudogout. This is a painful condition that can hit the wrists and knees and cause severe pain. Colchicine generally helps this.   Take colchicine 1 a day as a [place]

## 2021-01-06 ENCOUNTER — OFFICE VISIT (OUTPATIENT)
Dept: RHEUMATOLOGY | Facility: CLINIC | Age: 76
End: 2021-01-06
Payer: COMMERCIAL

## 2021-01-06 VITALS
DIASTOLIC BLOOD PRESSURE: 84 MMHG | SYSTOLIC BLOOD PRESSURE: 128 MMHG | BODY MASS INDEX: 41.32 KG/M2 | WEIGHT: 248 LBS | HEIGHT: 65 IN | TEMPERATURE: 97 F | HEART RATE: 68 BPM | RESPIRATION RATE: 18 BRPM

## 2021-01-06 DIAGNOSIS — Z98.890 STATUS POST LUMBAR LAMINECTOMY: ICD-10-CM

## 2021-01-06 DIAGNOSIS — M19.042 OSTEOARTHRITIS OF FINGERS OF BOTH HANDS: ICD-10-CM

## 2021-01-06 DIAGNOSIS — Z98.1 HISTORY OF FUSION OF CERVICAL SPINE: ICD-10-CM

## 2021-01-06 DIAGNOSIS — M18.0 OSTEOARTHRITIS OF CARPOMETACARPAL (CMC) JOINT OF BOTH THUMBS: Primary | ICD-10-CM

## 2021-01-06 DIAGNOSIS — Z87.39 H/O CALCIUM PYROPHOSPHATE DEPOSITION DISEASE (CPPD): ICD-10-CM

## 2021-01-06 DIAGNOSIS — M19.041 OSTEOARTHRITIS OF FINGERS OF BOTH HANDS: ICD-10-CM

## 2021-01-06 PROCEDURE — 3074F SYST BP LT 130 MM HG: CPT | Performed by: INTERNAL MEDICINE

## 2021-01-06 PROCEDURE — 3008F BODY MASS INDEX DOCD: CPT | Performed by: INTERNAL MEDICINE

## 2021-01-06 PROCEDURE — 99214 OFFICE O/P EST MOD 30 MIN: CPT | Performed by: INTERNAL MEDICINE

## 2021-01-06 PROCEDURE — 3079F DIAST BP 80-89 MM HG: CPT | Performed by: INTERNAL MEDICINE

## 2021-01-06 RX ORDER — COLCHICINE 0.6 MG/1
0.6 TABLET ORAL 2 TIMES DAILY
Qty: 180 TABLET | Refills: 3 | Status: SHIPPED | OUTPATIENT
Start: 2021-01-06 | End: 2021-01-22

## 2021-01-06 RX ORDER — COLCHICINE 0.6 MG/1
0.6 TABLET ORAL 2 TIMES DAILY
Qty: 60 TABLET | Refills: 5 | Status: SHIPPED | OUTPATIENT
Start: 2021-01-06 | End: 2021-01-06

## 2021-01-06 RX ORDER — MELOXICAM 7.5 MG/1
7.5 TABLET ORAL 2 TIMES DAILY PRN
Qty: 60 TABLET | Refills: 3 | Status: SHIPPED | OUTPATIENT
Start: 2021-01-06

## 2021-01-06 NOTE — PATIENT INSTRUCTIONS
Continue colchicine . 6 mg twice a day for pseudogout. Use Meloxicam 7.5 mg twice a day for osteoarthritis. Use ES Tylenol 500 mg twice a day as needed. Apply diclofenac gel as needed 3-4 times per day. OK to use CBD slave twice a day.   Return to office

## 2021-01-06 NOTE — PROGRESS NOTES
[facility]  Dr. Deja Redding Progress Note     Subjective:   Deepthi Acevedo is a(n) 76year old male. Current complaints: Patient presents with:  Osteoarthritis: est pt- fu 6mo- pt has CPPD - Pt co hand and wrist pain.  Hard time working, more he does

## 2021-01-11 ENCOUNTER — LAB ENCOUNTER (OUTPATIENT)
Dept: LAB | Age: 76
End: 2021-01-11
Attending: INTERNAL MEDICINE
Payer: COMMERCIAL

## 2021-01-11 DIAGNOSIS — R97.20 ELEVATED PROSTATE SPECIFIC ANTIGEN (PSA): Primary | ICD-10-CM

## 2021-01-11 DIAGNOSIS — Z00.01 ENCOUNTER FOR GENERAL ADULT MEDICAL EXAMINATION WITH ABNORMAL FINDINGS: ICD-10-CM

## 2021-01-11 LAB
ALBUMIN SERPL-MCNC: 3.8 G/DL (ref 3.4–5)
ALBUMIN/GLOB SERPL: 1.1 {RATIO} (ref 1–2)
ALP LIVER SERPL-CCNC: 76 U/L
ALT SERPL-CCNC: 45 U/L
ANION GAP SERPL CALC-SCNC: 1 MMOL/L (ref 0–18)
AST SERPL-CCNC: 31 U/L (ref 15–37)
BASOPHILS # BLD AUTO: 0.09 X10(3) UL (ref 0–0.2)
BASOPHILS NFR BLD AUTO: 1.5 %
BILIRUB SERPL-MCNC: 0.6 MG/DL (ref 0.1–2)
BILIRUB UR QL: NEGATIVE
BUN BLD-MCNC: 19 MG/DL (ref 7–18)
BUN/CREAT SERPL: 17.9 (ref 10–20)
CALCIUM BLD-MCNC: 9.7 MG/DL (ref 8.5–10.1)
CHLORIDE SERPL-SCNC: 105 MMOL/L (ref 98–112)
CHOLEST SMN-MCNC: 172 MG/DL (ref ?–200)
CLARITY UR: CLEAR
CO2 SERPL-SCNC: 33 MMOL/L (ref 21–32)
COLOR UR: YELLOW
CREAT BLD-MCNC: 1.06 MG/DL
DEPRECATED RDW RBC AUTO: 42.9 FL (ref 35.1–46.3)
EOSINOPHIL # BLD AUTO: 0.15 X10(3) UL (ref 0–0.7)
EOSINOPHIL NFR BLD AUTO: 2.6 %
ERYTHROCYTE [DISTWIDTH] IN BLOOD BY AUTOMATED COUNT: 12.8 % (ref 11–15)
EST. AVERAGE GLUCOSE BLD GHB EST-MCNC: 108 MG/DL (ref 68–126)
GLOBULIN PLAS-MCNC: 3.6 G/DL (ref 2.8–4.4)
GLUCOSE BLD-MCNC: 92 MG/DL (ref 70–99)
GLUCOSE UR-MCNC: NEGATIVE MG/DL
HBA1C MFR BLD HPLC: 5.4 % (ref ?–5.7)
HCT VFR BLD AUTO: 50.2 %
HDLC SERPL-MCNC: 70 MG/DL (ref 40–59)
HGB BLD-MCNC: 16.1 G/DL
HGB UR QL STRIP.AUTO: NEGATIVE
IMM GRANULOCYTES # BLD AUTO: 0.02 X10(3) UL (ref 0–1)
IMM GRANULOCYTES NFR BLD: 0.3 %
KETONES UR-MCNC: NEGATIVE MG/DL
LDLC SERPL CALC-MCNC: 86 MG/DL (ref ?–100)
LEUKOCYTE ESTERASE UR QL STRIP.AUTO: NEGATIVE
LYMPHOCYTES # BLD AUTO: 1.95 X10(3) UL (ref 1–4)
LYMPHOCYTES NFR BLD AUTO: 33.4 %
M PROTEIN MFR SERPL ELPH: 7.4 G/DL (ref 6.4–8.2)
MCH RBC QN AUTO: 29.1 PG (ref 26–34)
MCHC RBC AUTO-ENTMCNC: 32.1 G/DL (ref 31–37)
MCV RBC AUTO: 90.8 FL
MONOCYTES # BLD AUTO: 0.55 X10(3) UL (ref 0.1–1)
MONOCYTES NFR BLD AUTO: 9.4 %
NEUTROPHILS # BLD AUTO: 3.07 X10 (3) UL (ref 1.5–7.7)
NEUTROPHILS # BLD AUTO: 3.07 X10(3) UL (ref 1.5–7.7)
NEUTROPHILS NFR BLD AUTO: 52.8 %
NITRITE UR QL STRIP.AUTO: NEGATIVE
NONHDLC SERPL-MCNC: 102 MG/DL (ref ?–130)
OSMOLALITY SERPL CALC.SUM OF ELEC: 290 MOSM/KG (ref 275–295)
PATIENT FASTING Y/N/NP: YES
PATIENT FASTING Y/N/NP: YES
PH UR: 6 [PH] (ref 5–8)
PLATELET # BLD AUTO: 197 10(3)UL (ref 150–450)
POTASSIUM SERPL-SCNC: 4.8 MMOL/L (ref 3.5–5.1)
PROT UR-MCNC: NEGATIVE MG/DL
PSA SERPL-MCNC: 1.77 NG/ML (ref ?–4)
RBC # BLD AUTO: 5.53 X10(6)UL
SODIUM SERPL-SCNC: 139 MMOL/L (ref 136–145)
SP GR UR STRIP: 1.01 (ref 1–1.03)
TRIGL SERPL-MCNC: 80 MG/DL (ref 30–149)
TSI SER-ACNC: 1.4 MIU/ML (ref 0.36–3.74)
UROBILINOGEN UR STRIP-ACNC: <2
VLDLC SERPL CALC-MCNC: 16 MG/DL (ref 0–30)
WBC # BLD AUTO: 5.8 X10(3) UL (ref 4–11)

## 2021-01-11 PROCEDURE — 36415 COLL VENOUS BLD VENIPUNCTURE: CPT

## 2021-01-11 PROCEDURE — 84153 ASSAY OF PSA TOTAL: CPT

## 2021-01-11 PROCEDURE — 85025 COMPLETE CBC W/AUTO DIFF WBC: CPT

## 2021-01-11 PROCEDURE — 81003 URINALYSIS AUTO W/O SCOPE: CPT

## 2021-01-11 PROCEDURE — 84443 ASSAY THYROID STIM HORMONE: CPT

## 2021-01-11 PROCEDURE — 80061 LIPID PANEL: CPT

## 2021-01-11 PROCEDURE — 83036 HEMOGLOBIN GLYCOSYLATED A1C: CPT

## 2021-01-11 PROCEDURE — 80053 COMPREHEN METABOLIC PANEL: CPT

## 2021-01-22 ENCOUNTER — OFFICE VISIT (OUTPATIENT)
Dept: NEUROLOGY | Facility: CLINIC | Age: 76
End: 2021-01-22
Payer: COMMERCIAL

## 2021-01-22 ENCOUNTER — HOSPITAL ENCOUNTER (OUTPATIENT)
Dept: GENERAL RADIOLOGY | Facility: HOSPITAL | Age: 76
Discharge: HOME | End: 2021-01-22
Attending: PHYSICAL MEDICINE & REHABILITATION
Payer: COMMERCIAL

## 2021-01-22 VITALS — HEIGHT: 65 IN | BODY MASS INDEX: 40.98 KG/M2 | WEIGHT: 246 LBS

## 2021-01-22 DIAGNOSIS — M96.1 POSTLAMINECTOMY SYNDROME, CERVICAL: ICD-10-CM

## 2021-01-22 DIAGNOSIS — M96.1 POSTLAMINECTOMY SYNDROME, CERVICAL: Primary | ICD-10-CM

## 2021-01-22 PROCEDURE — 3008F BODY MASS INDEX DOCD: CPT | Performed by: PHYSICAL MEDICINE & REHABILITATION

## 2021-01-22 PROCEDURE — 72052 X-RAY EXAM NECK SPINE 6/>VWS: CPT | Performed by: PHYSICAL MEDICINE & REHABILITATION

## 2021-01-22 PROCEDURE — 99203 OFFICE O/P NEW LOW 30 MIN: CPT | Performed by: PHYSICAL MEDICINE & REHABILITATION

## 2021-01-22 RX ORDER — LIFITEGRAST 50 MG/ML
1 SOLUTION/ DROPS OPHTHALMIC DAILY
COMMUNITY

## 2021-01-22 RX ORDER — ZINC SULFATE 50(220)MG
220 CAPSULE ORAL DAILY
COMMUNITY

## 2021-01-22 NOTE — PROGRESS NOTES
[address]  Progress Note    CHIEF COMPLAINT:  Patient presents with:  Neck Pain: New patient referred by Dr. Vaibhav Boothe for two herniated disc.  Patient states that he has one herniated disc above fusion and anot FUSION      CERVICAL   • TONSILLECTOMY         SOCIAL HISTORY:   Social History    Occupational History      Not on file    Tobacco Use      Smoking status: Former Smoker        Packs/day: 1.00        Years: 30.00        Pack years: 27        Quit date: 6/ denies  Easy Bleeding: denies   Genitourinary  Difficulty Urinating: denies  Bladder Incontinence: denies  Pelvic Pain: denies  Painful Urination: denies   Musculoskeletal  Joint Stiffness: denies  Painful Joints: admits  Tailbone Pain: denies  Swollen Deana were no barriers to learning.         Radames Mortimer, MD  Physical Medicine and Rehabilitation/Sports Medicine  MEDICAL CENTER [facility]

## 2021-01-26 ENCOUNTER — TELEPHONE (OUTPATIENT)
Dept: NEUROLOGY | Facility: CLINIC | Age: 76
End: 2021-01-26

## 2021-01-26 NOTE — TELEPHONE ENCOUNTER
----- Message from Ling Laboy MD sent at 1/25/2021 10:03 PM CST -----  I personally reviewed a plain film x-ray of the cervical spine from January 2021 showing a C5-6 noninstrumented fusion with multilevel severe disc degeneration and endplate osteo

## 2021-01-26 NOTE — TELEPHONE ENCOUNTER
Spoke with patient to notify of message below. Patient verbalized understanding and states that he will be leaving to [place] and will follow up when he returns.

## 2021-02-03 ENCOUNTER — TELEPHONE (OUTPATIENT)
Dept: RHEUMATOLOGY | Facility: CLINIC | Age: 76
End: 2021-02-03

## 2021-02-03 DIAGNOSIS — M18.0 OSTEOARTHRITIS OF CARPOMETACARPAL (CMC) JOINT OF BOTH THUMBS: Primary | ICD-10-CM

## 2021-02-03 NOTE — TELEPHONE ENCOUNTER
Lab testing from [facility] 1/11/2021 CBC normal white count 5.8 hemoglobin 16.1 hematocrit 50.2 MCV 90 platelet count 356,834.   Chemistry profile essentially normal glucose 92 sodium 139 potassium 4.8 chloride 105 BUN 19 creatinine 1.06 ca

## 2021-03-09 DIAGNOSIS — Z23 NEED FOR VACCINATION: ICD-10-CM

## 2021-03-24 ENCOUNTER — HOSPITAL ENCOUNTER (OUTPATIENT)
Dept: ULTRASOUND IMAGING | Facility: HOSPITAL | Age: 76
Discharge: HOME | End: 2021-03-24
Attending: INTERNAL MEDICINE
Payer: COMMERCIAL

## 2021-03-24 DIAGNOSIS — R60.0 LOCALIZED EDEMA: Primary | ICD-10-CM

## 2021-03-24 DIAGNOSIS — R60.0 LOCALIZED EDEMA: ICD-10-CM

## 2021-03-24 PROCEDURE — 93971 EXTREMITY STUDY: CPT | Performed by: INTERNAL MEDICINE

## 2021-05-28 ENCOUNTER — TELEPHONE (OUTPATIENT)
Dept: ORTHOPEDICS CLINIC | Facility: CLINIC | Age: 76
End: 2021-05-28

## 2021-05-28 DIAGNOSIS — M25.511 RIGHT SHOULDER PAIN, UNSPECIFIED CHRONICITY: Primary | ICD-10-CM

## 2021-05-28 NOTE — TELEPHONE ENCOUNTER
Patient coming in for RT shoulder pain. States he had a fall a while ago and pain started in the Rt shoulder. Patient was last seen by DR. Lu Soares back at [facility] over 2 years ago.  Patient had not had recent imaging, if imaging needed please p

## 2021-06-02 ENCOUNTER — HOSPITAL ENCOUNTER (OUTPATIENT)
Dept: GENERAL RADIOLOGY | Age: 76
Discharge: HOME | End: 2021-06-02
Attending: PHYSICIAN ASSISTANT
Payer: COMMERCIAL

## 2021-06-02 ENCOUNTER — OFFICE VISIT (OUTPATIENT)
Dept: ORTHOPEDICS CLINIC | Facility: CLINIC | Age: 76
End: 2021-06-02
Payer: COMMERCIAL

## 2021-06-02 VITALS — WEIGHT: 247 LBS | HEIGHT: 65 IN | BODY MASS INDEX: 41.15 KG/M2

## 2021-06-02 DIAGNOSIS — M25.511 RIGHT SHOULDER PAIN, UNSPECIFIED CHRONICITY: ICD-10-CM

## 2021-06-02 DIAGNOSIS — M19.012 PRIMARY OSTEOARTHRITIS OF LEFT SHOULDER: Primary | ICD-10-CM

## 2021-06-02 PROCEDURE — 3008F BODY MASS INDEX DOCD: CPT | Performed by: PHYSICIAN ASSISTANT

## 2021-06-02 PROCEDURE — 20610 DRAIN/INJ JOINT/BURSA W/O US: CPT | Performed by: PHYSICIAN ASSISTANT

## 2021-06-02 PROCEDURE — 99203 OFFICE O/P NEW LOW 30 MIN: CPT | Performed by: PHYSICIAN ASSISTANT

## 2021-06-02 PROCEDURE — 73030 X-RAY EXAM OF SHOULDER: CPT | Performed by: PHYSICIAN ASSISTANT

## 2021-06-02 RX ORDER — TRIAMCINOLONE ACETONIDE 40 MG/ML
40 INJECTION, SUSPENSION INTRA-ARTICULAR; INTRAMUSCULAR ONCE
Status: COMPLETED | OUTPATIENT
Start: 2021-06-02 | End: 2021-06-02

## 2021-06-02 RX ADMIN — TRIAMCINOLONE ACETONIDE 40 MG: 40 INJECTION, SUSPENSION INTRA-ARTICULAR; INTRAMUSCULAR at 12:06:00

## 2021-06-02 NOTE — PROGRESS NOTES
[facility] New Problem Note    CC: Left shoulder pain    HPI: This 76year old male presents today with complaints of left shoulder pain.   He has been seen at sports med by Dr. Jose Quesada in the past and has a known diagnosis of degenerative arthritis Tab Take 1 tablet (7.5 mg total) by mouth 2 (two) times daily as needed for Pain. 60 tablet 3   • Diclofenac Sodium (VOLTAREN) 1 % External Gel Apply 4 g topically 4 (four) times daily.  100 g 3   • Vit C-Cholecalciferol-Marychuy Hip 500-1000-20 MG-UNIT-MG Oral is present to light touch. He is crepitation with passive range of motion of the shoulder. Imagin views of the left shoulder were personally viewed, independently interpreted and radiology report read.   They show moderate degenerative changes i

## 2021-06-07 ENCOUNTER — TELEPHONE (OUTPATIENT)
Dept: NEUROLOGY | Facility: CLINIC | Age: 76
End: 2021-06-07

## 2021-06-07 ENCOUNTER — OFFICE VISIT (OUTPATIENT)
Dept: NEUROLOGY | Facility: CLINIC | Age: 76
End: 2021-06-07
Payer: COMMERCIAL

## 2021-06-07 VITALS
WEIGHT: 245 LBS | HEIGHT: 65 IN | DIASTOLIC BLOOD PRESSURE: 70 MMHG | SYSTOLIC BLOOD PRESSURE: 120 MMHG | BODY MASS INDEX: 40.82 KG/M2

## 2021-06-07 DIAGNOSIS — E66.01 CLASS 3 SEVERE OBESITY DUE TO EXCESS CALORIES WITHOUT SERIOUS COMORBIDITY WITH BODY MASS INDEX (BMI) OF 40.0 TO 44.9 IN ADULT (HCC): ICD-10-CM

## 2021-06-07 DIAGNOSIS — M54.2 CERVICALGIA: ICD-10-CM

## 2021-06-07 DIAGNOSIS — R29.6 FALLING: ICD-10-CM

## 2021-06-07 DIAGNOSIS — Z98.890 STATUS POST LUMBAR LAMINECTOMY: ICD-10-CM

## 2021-06-07 DIAGNOSIS — M96.1 POSTLAMINECTOMY SYNDROME, CERVICAL: Primary | ICD-10-CM

## 2021-06-07 PROCEDURE — 3074F SYST BP LT 130 MM HG: CPT | Performed by: PHYSICAL MEDICINE & REHABILITATION

## 2021-06-07 PROCEDURE — 3008F BODY MASS INDEX DOCD: CPT | Performed by: PHYSICAL MEDICINE & REHABILITATION

## 2021-06-07 PROCEDURE — 3078F DIAST BP <80 MM HG: CPT | Performed by: PHYSICAL MEDICINE & REHABILITATION

## 2021-06-07 PROCEDURE — 99214 OFFICE O/P EST MOD 30 MIN: CPT | Performed by: PHYSICAL MEDICINE & REHABILITATION

## 2021-06-07 NOTE — TELEPHONE ENCOUNTER
[employer] for authorization of approval for MRI C-spine w/wo cpt code 43548. Authorization # [id number] effective 06/07/21 to 07/06/21. Pt.  informed. He will call later to schedule appt.

## 2021-06-07 NOTE — PROGRESS NOTES
[address]  Progress Note    CHIEF COMPLAINT:  Patient presents with:  Neck Pain: pt is here for f/u neck pain. LOV 1/22/21 pain level 6/10 with activity. using meloxicam,tylenol  and advil for pain.  x ray • OTHER SURGICAL HISTORY Bilateral     shoulder   • SPINAL FUSION      CERVICAL   • TONSILLECTOMY         SOCIAL HISTORY:   Social History    Occupational History      Not on file    Tobacco Use      Smoking status: Former Smoker        Packs/day: 1.00 comprehention intact, spontaneous speech intact  Strength: Upper extremities have 5/5 strength  Sensation: Normal upper extremities  Reflexes: Normal upper extremities  Spurling's sign: neg     Data     Radiology Imagin.   Shoulder MRIs from  show

## 2021-06-27 PROBLEM — M96.1 POSTLAMINECTOMY SYNDROME, CERVICAL: Status: ACTIVE | Noted: 2021-06-27

## 2021-06-27 PROBLEM — M54.2 CERVICALGIA: Status: ACTIVE | Noted: 2021-06-27

## 2021-06-27 PROBLEM — R29.6 FALLING: Status: ACTIVE | Noted: 2021-06-27

## 2021-06-28 ENCOUNTER — HOSPITAL ENCOUNTER (OUTPATIENT)
Dept: MRI IMAGING | Facility: HOSPITAL | Age: 76
Discharge: HOME | End: 2021-06-28
Attending: PHYSICAL MEDICINE & REHABILITATION
Payer: COMMERCIAL

## 2021-06-28 DIAGNOSIS — R29.6 FALLING: ICD-10-CM

## 2021-06-28 DIAGNOSIS — M96.1 POSTLAMINECTOMY SYNDROME, CERVICAL: ICD-10-CM

## 2021-06-28 LAB — CREAT BLD-MCNC: 1.2 MG/DL

## 2021-06-28 PROCEDURE — 82565 ASSAY OF CREATININE: CPT

## 2021-06-28 PROCEDURE — A9575 INJ GADOTERATE MEGLUMI 0.1ML: HCPCS | Performed by: PHYSICAL MEDICINE & REHABILITATION

## 2021-06-28 PROCEDURE — 72156 MRI NECK SPINE W/O & W/DYE: CPT | Performed by: PHYSICAL MEDICINE & REHABILITATION

## 2021-07-01 ENCOUNTER — TELEPHONE (OUTPATIENT)
Dept: NEUROLOGY | Facility: CLINIC | Age: 76
End: 2021-07-01

## 2021-07-01 NOTE — TELEPHONE ENCOUNTER
Informed patient of imaging results and recommendation's per Dr. Cali Funes. Patient verbalized understanding and scheduled f/u appt on 07/23/21.

## 2021-07-01 NOTE — TELEPHONE ENCOUNTER
----- Message from Nannette Lopez MD sent at 7/1/2021 11:15 AM CDT -----  I personally reviewed a cervical MRI showing C56 instrumented vertebral body fusion. Just posterior to that the spinal cord is atrophic with central T2 signal abnormality.   Super

## 2021-07-07 ENCOUNTER — OFFICE VISIT (OUTPATIENT)
Dept: RHEUMATOLOGY | Facility: CLINIC | Age: 76
End: 2021-07-07
Payer: COMMERCIAL

## 2021-07-07 VITALS
WEIGHT: 247 LBS | DIASTOLIC BLOOD PRESSURE: 70 MMHG | TEMPERATURE: 98 F | HEIGHT: 65 IN | RESPIRATION RATE: 16 BRPM | SYSTOLIC BLOOD PRESSURE: 140 MMHG | BODY MASS INDEX: 41.15 KG/M2 | HEART RATE: 60 BPM

## 2021-07-07 DIAGNOSIS — M18.0 OSTEOARTHRITIS OF CARPOMETACARPAL (CMC) JOINT OF BOTH THUMBS: ICD-10-CM

## 2021-07-07 DIAGNOSIS — Z98.1 HISTORY OF FUSION OF CERVICAL SPINE: ICD-10-CM

## 2021-07-07 DIAGNOSIS — M11.261 CHONDROCALCINOSIS OF BOTH KNEES: ICD-10-CM

## 2021-07-07 DIAGNOSIS — M11.262 CHONDROCALCINOSIS OF BOTH KNEES: ICD-10-CM

## 2021-07-07 DIAGNOSIS — M48.061 SPINAL STENOSIS OF LUMBAR REGION WITHOUT NEUROGENIC CLAUDICATION: ICD-10-CM

## 2021-07-07 DIAGNOSIS — M48.02 NEUROFORAMINAL STENOSIS OF CERVICAL SPINE: ICD-10-CM

## 2021-07-07 DIAGNOSIS — M19.042 OSTEOARTHRITIS OF FINGERS OF BOTH HANDS: Primary | ICD-10-CM

## 2021-07-07 DIAGNOSIS — M19.041 OSTEOARTHRITIS OF FINGERS OF BOTH HANDS: Primary | ICD-10-CM

## 2021-07-07 PROCEDURE — 3008F BODY MASS INDEX DOCD: CPT | Performed by: INTERNAL MEDICINE

## 2021-07-07 PROCEDURE — 3078F DIAST BP <80 MM HG: CPT | Performed by: INTERNAL MEDICINE

## 2021-07-07 PROCEDURE — 99213 OFFICE O/P EST LOW 20 MIN: CPT | Performed by: INTERNAL MEDICINE

## 2021-07-07 PROCEDURE — 3077F SYST BP >= 140 MM HG: CPT | Performed by: INTERNAL MEDICINE

## 2021-07-07 RX ORDER — COLCHICINE 0.6 MG/1
0.6 TABLET ORAL 2 TIMES DAILY
Qty: 180 TABLET | Refills: 3 | Status: SHIPPED | OUTPATIENT
Start: 2021-07-07

## 2021-07-07 RX ORDER — TURMERIC/TURMERIC EXT/PEPR EXT 900-100 MG
CAPSULE ORAL
COMMUNITY
End: 2021-11-12

## 2021-07-07 RX ORDER — COLCHICINE 0.6 MG/1
0.6 TABLET ORAL
COMMUNITY
Start: 2020-02-26 | End: 2021-07-07

## 2021-07-07 NOTE — PROGRESS NOTES
[facility]  Dr. Tipton Every Progress Note     Subjective:   Dusty Lawrence is a(n) 76year old male. Current complaints: Patient presents with:  Osteoarthritis: 6 month f/u. Feeling pretty good. Pain in hands and fingers. Sometimes arms.  Neck pain cervical spine  Neuroforaminal stenosis of cervical spine  Plan:   Patient Instructions   Continue colchicine 0.6 mg twice a day. If you continue to feel good then you can try to lower the colchicine to 1 a day.   Colchicine is used to prevent attacks of p

## 2021-07-07 NOTE — PATIENT INSTRUCTIONS
Continue colchicine 0.6 mg twice a day. If you continue to feel good then you can try to lower the colchicine to 1 a day. Colchicine is used to prevent attacks of pseudogout and gout. You do have the chondrocalcinosis buildup of pseudogout in the knees.

## 2021-07-23 ENCOUNTER — OFFICE VISIT (OUTPATIENT)
Dept: NEUROLOGY | Facility: CLINIC | Age: 76
End: 2021-07-23
Payer: COMMERCIAL

## 2021-07-23 VITALS
HEIGHT: 65 IN | OXYGEN SATURATION: 97 % | WEIGHT: 247 LBS | SYSTOLIC BLOOD PRESSURE: 142 MMHG | BODY MASS INDEX: 41.15 KG/M2 | DIASTOLIC BLOOD PRESSURE: 92 MMHG | HEART RATE: 54 BPM

## 2021-07-23 DIAGNOSIS — M19.011 PRIMARY OSTEOARTHRITIS OF BOTH SHOULDERS: ICD-10-CM

## 2021-07-23 DIAGNOSIS — M19.012 PRIMARY OSTEOARTHRITIS OF BOTH SHOULDERS: ICD-10-CM

## 2021-07-23 DIAGNOSIS — G95.89 MYELOMALACIA (HCC): ICD-10-CM

## 2021-07-23 DIAGNOSIS — R26.9 GAIT DISTURBANCE: Primary | ICD-10-CM

## 2021-07-23 PROCEDURE — 3080F DIAST BP >= 90 MM HG: CPT | Performed by: PHYSICAL MEDICINE & REHABILITATION

## 2021-07-23 PROCEDURE — 99214 OFFICE O/P EST MOD 30 MIN: CPT | Performed by: PHYSICAL MEDICINE & REHABILITATION

## 2021-07-23 PROCEDURE — 3008F BODY MASS INDEX DOCD: CPT | Performed by: PHYSICAL MEDICINE & REHABILITATION

## 2021-07-23 PROCEDURE — 3077F SYST BP >= 140 MM HG: CPT | Performed by: PHYSICAL MEDICINE & REHABILITATION

## 2021-07-23 NOTE — PROGRESS NOTES
[address]  Progress Note    CHIEF COMPLAINT:  Patient presents with:  Neck Pain: LOV: 6/7/21 Patient f/u on MRI Cervical (6/28/21) C/o neck pain that radiates to both shoulders (L>R) with N/T in hands/wrist    • LAMINECTOMY,>2 [facility]  2012    L2-L5 - Dr Kirt Blandon @ [place]   • OTHER SURGICAL HISTORY  4-2015    right meniscus    • OTHER SURGICAL HISTORY Bilateral     shoulder   • SPINAL FUSION      CERVICAL   • TONSILLECTOMY         SOCIAL HISTORY:   So [Levofloxa*    HIVES  Latex                   ITCHING    REVIEW OF SYSTEMS:   Patient-reported ROS  Constitutional  Sleep Disturbance: admits   Cardiovascular  Chest Pain: denies  Irregular Heartbeat: denies   Gastrointestinal  Bowel Incontinence: denies shoulder from June 2021 showing severe osteoarthritis. ASSESSMENT AND PLAN:  1. Gait disturbance  He believes his gait is not that bad, even though he complained of falling several times last visit. He is not interested in gait rehab.   Despite the know

## 2021-10-23 ENCOUNTER — HOSPITAL ENCOUNTER (OUTPATIENT)
Age: 76
Discharge: HOME | End: 2021-10-23
Payer: COMMERCIAL

## 2021-10-23 VITALS
WEIGHT: 246 LBS | SYSTOLIC BLOOD PRESSURE: 153 MMHG | HEIGHT: 65 IN | RESPIRATION RATE: 18 BRPM | OXYGEN SATURATION: 99 % | HEART RATE: 62 BPM | DIASTOLIC BLOOD PRESSURE: 73 MMHG | BODY MASS INDEX: 40.98 KG/M2 | TEMPERATURE: 98 F

## 2021-10-23 DIAGNOSIS — R30.0 DYSURIA: Primary | ICD-10-CM

## 2021-10-23 DIAGNOSIS — R31.29 MICROSCOPIC HEMATURIA: ICD-10-CM

## 2021-10-23 PROCEDURE — 81002 URINALYSIS NONAUTO W/O SCOPE: CPT | Performed by: NURSE PRACTITIONER

## 2021-10-23 PROCEDURE — 87086 URINE CULTURE/COLONY COUNT: CPT | Performed by: NURSE PRACTITIONER

## 2021-10-23 PROCEDURE — 99213 OFFICE O/P EST LOW 20 MIN: CPT | Performed by: NURSE PRACTITIONER

## 2021-10-23 NOTE — ED PROVIDER NOTES
Patient presents with:  Urinary Symptoms      HPI:     Paige Tristan is a 68year old male who presents with a chief complaint of some dysuria that started last night. No dkaota hematuria. No urgency or frequency. No abdominal or groin pain.   No fl Stress Concern: Not Asked        Weight Concern: Not Asked        Special Diet: Not Asked        Back Care: Not Asked        Exercise: Not Asked        Bike Helmet: Not Asked        Seat Belt: Not Asked        Self-Exams: Not Asked    Social History N 99%   BMI 40.94 kg/m²   GENERAL: well developed, well nourished, well hydrated, no distress  SKIN: good skin turgor, no rashes  HEENT:normocephalic, ears and throat are clear  EYES: sclera non icteric, conjunctiva non-injected  NECK: supple, no adenopathy

## 2021-10-26 NOTE — H&P (VIEW-ONLY)
Michelletarah Mukesh. is a 68year old male. Patient with history of microscopic hematuria and dysuria with fever. History of being a smoker. HPI:     I saw Dusty emergently in the office on 10/26/21.  He is a 42-year-old [address] [place] [address] who has an ectopic pe [facility]) 2008   • Rosacea    • Rotator cuff tear, left 2008      Past Surgical History:   Procedure Laterality Date   • BACK SURGERY     • COLONOSCOPY     • LAMINECTOMY,>2 [facility]  2012    L2-L5 - Dr Alex Luna @ [facility]   • OTHER SURGICAL HISTORY  4-201 negative  Hematologic/lymphatic: negative  Behavioral/Psych: negative  Endocrine: negative  Allergic/Immunologic:negative    PHYSICAL EXAM:     Blood pressure is 120/90, pulse 60, restaurant rate 16, temperature 97.3    GENERAL: alert and orientated X 3, w 01/11/2021           Recent Labs   Lab 10/23/21  1323   SPECGRAVITY 1.025   GLUUR Negative       No results for input(s): URINE, CULTI, BLDSMR in the last 168 hours. Imaging:    No results found.       Review of previous records: reviewed- if availabl

## 2021-10-30 ENCOUNTER — HOSPITAL ENCOUNTER (OUTPATIENT)
Dept: CT IMAGING | Facility: HOSPITAL | Age: 76
Discharge: HOME | End: 2021-10-30
Attending: UROLOGY
Payer: COMMERCIAL

## 2021-10-30 DIAGNOSIS — R31.9 HEMATURIA, UNSPECIFIED TYPE: ICD-10-CM

## 2021-10-30 DIAGNOSIS — N20.0 KIDNEY STONE: ICD-10-CM

## 2021-10-30 DIAGNOSIS — Q63.2 ECTOPIC KIDNEY: ICD-10-CM

## 2021-10-30 PROCEDURE — 82565 ASSAY OF CREATININE: CPT

## 2021-10-30 PROCEDURE — 76377 3D RENDER W/INTRP POSTPROCES: CPT | Performed by: UROLOGY

## 2021-10-30 PROCEDURE — 74178 CT ABD&PLV WO CNTR FLWD CNTR: CPT | Performed by: UROLOGY

## 2021-11-12 ENCOUNTER — LAB ENCOUNTER (OUTPATIENT)
Dept: LAB | Age: 76
End: 2021-11-12
Attending: UROLOGY
Payer: COMMERCIAL

## 2021-11-12 DIAGNOSIS — Z01.818 PREOP TESTING: ICD-10-CM

## 2021-11-15 ENCOUNTER — APPOINTMENT (OUTPATIENT)
Dept: GENERAL RADIOLOGY | Facility: HOSPITAL | Age: 76
End: 2021-11-15
Attending: UROLOGY
Payer: COMMERCIAL

## 2021-11-15 ENCOUNTER — ANESTHESIA EVENT (OUTPATIENT)
Dept: SURGERY | Facility: HOSPITAL | Age: 76
End: 2021-11-15
Payer: COMMERCIAL

## 2021-11-15 ENCOUNTER — ANESTHESIA (OUTPATIENT)
Dept: SURGERY | Facility: HOSPITAL | Age: 76
End: 2021-11-15
Payer: COMMERCIAL

## 2021-11-15 ENCOUNTER — HOSPITAL ENCOUNTER (OUTPATIENT)
Facility: HOSPITAL | Age: 76
Setting detail: HOSPITAL OUTPATIENT SURGERY
Discharge: HOME | End: 2021-11-15
Attending: UROLOGY | Admitting: UROLOGY
Payer: COMMERCIAL

## 2021-11-15 VITALS
BODY MASS INDEX: 41.32 KG/M2 | OXYGEN SATURATION: 95 % | SYSTOLIC BLOOD PRESSURE: 132 MMHG | RESPIRATION RATE: 16 BRPM | DIASTOLIC BLOOD PRESSURE: 71 MMHG | HEART RATE: 51 BPM | TEMPERATURE: 99 F | HEIGHT: 65 IN | WEIGHT: 248 LBS

## 2021-11-15 DIAGNOSIS — Z01.818 PREOP TESTING: Primary | ICD-10-CM

## 2021-11-15 PROCEDURE — 74420 UROGRAPHY RTRGR +-KUB: CPT | Performed by: UROLOGY

## 2021-11-15 PROCEDURE — 0T7D8ZZ DILATION OF URETHRA, VIA NATURAL OR ARTIFICIAL OPENING ENDOSCOPIC: ICD-10-PCS | Performed by: UROLOGY

## 2021-11-15 RX ORDER — HYDROCODONE BITARTRATE AND ACETAMINOPHEN 5; 325 MG/1; MG/1
2 TABLET ORAL AS NEEDED
Status: DISCONTINUED | OUTPATIENT
Start: 2021-11-15 | End: 2021-11-15

## 2021-11-15 RX ORDER — ACETAMINOPHEN 325 MG/1
650 TABLET ORAL
Status: CANCELLED | OUTPATIENT
Start: 2021-11-15

## 2021-11-15 RX ORDER — HYDROCODONE BITARTRATE AND ACETAMINOPHEN 5; 325 MG/1; MG/1
1 TABLET ORAL AS NEEDED
Status: DISCONTINUED | OUTPATIENT
Start: 2021-11-15 | End: 2021-11-15

## 2021-11-15 RX ORDER — PHENAZOPYRIDINE HYDROCHLORIDE 95 MG/1
95 TABLET ORAL 3 TIMES DAILY PRN
Qty: 21 TABLET | Refills: 6 | Status: SHIPPED | OUTPATIENT
Start: 2021-11-15

## 2021-11-15 RX ORDER — HYDROMORPHONE HYDROCHLORIDE 1 MG/ML
0.2 INJECTION, SOLUTION INTRAMUSCULAR; INTRAVENOUS; SUBCUTANEOUS EVERY 5 MIN PRN
Status: DISCONTINUED | OUTPATIENT
Start: 2021-11-15 | End: 2021-11-15

## 2021-11-15 RX ORDER — HYDROMORPHONE HYDROCHLORIDE 1 MG/ML
0.4 INJECTION, SOLUTION INTRAMUSCULAR; INTRAVENOUS; SUBCUTANEOUS EVERY 5 MIN PRN
Status: DISCONTINUED | OUTPATIENT
Start: 2021-11-15 | End: 2021-11-15

## 2021-11-15 RX ORDER — OXYCODONE HYDROCHLORIDE 5 MG/1
2.5 TABLET ORAL EVERY 4 HOURS PRN
Status: CANCELLED | OUTPATIENT
Start: 2021-11-15

## 2021-11-15 RX ORDER — OXYCODONE HYDROCHLORIDE 5 MG/1
5 TABLET ORAL EVERY 4 HOURS PRN
Status: CANCELLED | OUTPATIENT
Start: 2021-11-15

## 2021-11-15 RX ORDER — LIDOCAINE HYDROCHLORIDE 10 MG/ML
INJECTION, SOLUTION EPIDURAL; INFILTRATION; INTRACAUDAL; PERINEURAL AS NEEDED
Status: DISCONTINUED | OUTPATIENT
Start: 2021-11-15 | End: 2021-11-15 | Stop reason: SURG

## 2021-11-15 RX ORDER — PROCHLORPERAZINE EDISYLATE 5 MG/ML
5 INJECTION INTRAMUSCULAR; INTRAVENOUS ONCE AS NEEDED
Status: DISCONTINUED | OUTPATIENT
Start: 2021-11-15 | End: 2021-11-15

## 2021-11-15 RX ORDER — HYDROMORPHONE HYDROCHLORIDE 1 MG/ML
0.6 INJECTION, SOLUTION INTRAMUSCULAR; INTRAVENOUS; SUBCUTANEOUS EVERY 5 MIN PRN
Status: DISCONTINUED | OUTPATIENT
Start: 2021-11-15 | End: 2021-11-15

## 2021-11-15 RX ORDER — SODIUM CHLORIDE, SODIUM LACTATE, POTASSIUM CHLORIDE, CALCIUM CHLORIDE 600; 310; 30; 20 MG/100ML; MG/100ML; MG/100ML; MG/100ML
INJECTION, SOLUTION INTRAVENOUS CONTINUOUS
Status: DISCONTINUED | OUTPATIENT
Start: 2021-11-15 | End: 2021-11-15

## 2021-11-15 RX ORDER — PHENAZOPYRIDINE HYDROCHLORIDE 200 MG/1
200 TABLET, FILM COATED ORAL ONCE AS NEEDED
Status: CANCELLED | OUTPATIENT
Start: 2021-11-15 | End: 2021-11-15

## 2021-11-15 RX ORDER — MORPHINE SULFATE 4 MG/ML
4 INJECTION, SOLUTION INTRAMUSCULAR; INTRAVENOUS EVERY 10 MIN PRN
Status: DISCONTINUED | OUTPATIENT
Start: 2021-11-15 | End: 2021-11-15

## 2021-11-15 RX ORDER — NALOXONE HYDROCHLORIDE 0.4 MG/ML
80 INJECTION, SOLUTION INTRAMUSCULAR; INTRAVENOUS; SUBCUTANEOUS AS NEEDED
Status: DISCONTINUED | OUTPATIENT
Start: 2021-11-15 | End: 2021-11-15

## 2021-11-15 RX ORDER — HYDROMORPHONE HYDROCHLORIDE 1 MG/ML
0.2 INJECTION, SOLUTION INTRAMUSCULAR; INTRAVENOUS; SUBCUTANEOUS EVERY 2 HOUR PRN
Status: CANCELLED | OUTPATIENT
Start: 2021-11-15

## 2021-11-15 RX ORDER — ACETAMINOPHEN 500 MG
1000 TABLET ORAL ONCE
Status: COMPLETED | OUTPATIENT
Start: 2021-11-15 | End: 2021-11-15

## 2021-11-15 RX ORDER — MORPHINE SULFATE 4 MG/ML
2 INJECTION, SOLUTION INTRAMUSCULAR; INTRAVENOUS EVERY 10 MIN PRN
Status: DISCONTINUED | OUTPATIENT
Start: 2021-11-15 | End: 2021-11-15

## 2021-11-15 RX ORDER — HALOPERIDOL 5 MG/ML
0.25 INJECTION INTRAMUSCULAR ONCE AS NEEDED
Status: DISCONTINUED | OUTPATIENT
Start: 2021-11-15 | End: 2021-11-15

## 2021-11-15 RX ORDER — FAMOTIDINE 20 MG/1
20 TABLET ORAL ONCE
Status: COMPLETED | OUTPATIENT
Start: 2021-11-15 | End: 2021-11-15

## 2021-11-15 RX ORDER — MORPHINE SULFATE 10 MG/ML
6 INJECTION, SOLUTION INTRAMUSCULAR; INTRAVENOUS EVERY 10 MIN PRN
Status: DISCONTINUED | OUTPATIENT
Start: 2021-11-15 | End: 2021-11-15

## 2021-11-15 RX ORDER — DEXAMETHASONE SODIUM PHOSPHATE 4 MG/ML
VIAL (ML) INJECTION AS NEEDED
Status: DISCONTINUED | OUTPATIENT
Start: 2021-11-15 | End: 2021-11-15 | Stop reason: SURG

## 2021-11-15 RX ORDER — METOCLOPRAMIDE 10 MG/1
10 TABLET ORAL ONCE
Status: COMPLETED | OUTPATIENT
Start: 2021-11-15 | End: 2021-11-15

## 2021-11-15 RX ORDER — HYDROMORPHONE HYDROCHLORIDE 1 MG/ML
0.4 INJECTION, SOLUTION INTRAMUSCULAR; INTRAVENOUS; SUBCUTANEOUS EVERY 2 HOUR PRN
Status: CANCELLED | OUTPATIENT
Start: 2021-11-15

## 2021-11-15 RX ORDER — ONDANSETRON 2 MG/ML
4 INJECTION INTRAMUSCULAR; INTRAVENOUS ONCE AS NEEDED
Status: DISCONTINUED | OUTPATIENT
Start: 2021-11-15 | End: 2021-11-15

## 2021-11-15 RX ORDER — GENTAMICIN SULFATE 40 MG/ML
INJECTION, SOLUTION INTRAMUSCULAR; INTRAVENOUS AS NEEDED
Status: DISCONTINUED | OUTPATIENT
Start: 2021-11-15 | End: 2021-11-15 | Stop reason: HOSPADM

## 2021-11-15 RX ORDER — ONDANSETRON 2 MG/ML
INJECTION INTRAMUSCULAR; INTRAVENOUS AS NEEDED
Status: DISCONTINUED | OUTPATIENT
Start: 2021-11-15 | End: 2021-11-15 | Stop reason: SURG

## 2021-11-15 RX ADMIN — DEXAMETHASONE SODIUM PHOSPHATE 4 MG: 4 MG/ML VIAL (ML) INJECTION at 14:06:00

## 2021-11-15 RX ADMIN — ONDANSETRON 4 MG: 2 INJECTION INTRAMUSCULAR; INTRAVENOUS at 14:06:00

## 2021-11-15 RX ADMIN — LIDOCAINE HYDROCHLORIDE 50 MG: 10 INJECTION, SOLUTION EPIDURAL; INFILTRATION; INTRACAUDAL; PERINEURAL at 14:06:00

## 2021-11-15 RX ADMIN — SODIUM CHLORIDE, SODIUM LACTATE, POTASSIUM CHLORIDE, CALCIUM CHLORIDE: 600; 310; 30; 20 INJECTION, SOLUTION INTRAVENOUS at 14:00:00

## 2021-11-15 RX ADMIN — SODIUM CHLORIDE, SODIUM LACTATE, POTASSIUM CHLORIDE, CALCIUM CHLORIDE: 600; 310; 30; 20 INJECTION, SOLUTION INTRAVENOUS at 14:43:00

## 2021-11-15 NOTE — INTERVAL H&P NOTE
Pre-op Diagnosis: history of hematuria, history of kidney stones, ectopic kidney    The above referenced H&P was reviewed by Valencia Pacheco MD on 11/15/2021, the patient was examined and no significant changes have occurred in the patient's condition si

## 2021-11-15 NOTE — ANESTHESIA POSTPROCEDURE EVALUATION
Patient: Mady Murcia.     Procedure Summary     Date: 11/15/21 Room / Location: [address] [place] [address] MAIN OR 14 / [address] [place] [address] OR    Anesthesia Start: 1400 Anesthesia Stop: 3709    Procedure: cystoscopy, bilateral retrograde pyelograms, urethral dilation (N/A ) Diagnosis

## 2021-11-15 NOTE — BRIEF OP NOTE
[facility] POST ANESTHESIA CARE UNIT  Brief Op Note       Patients Name: Oni Magana.   Attending Physician: Dari Aggarwal MD  Operating Physician: Sorin Morales MD  CSN: [id number]     Location:  OR  MRN: [id number]    Date of Birth:

## 2021-11-15 NOTE — ANESTHESIA PROCEDURE NOTES
Airway  Date/Time: 11/15/2021 2:07 PM  Urgency: Elective      General Information and Staff    Patient location during procedure: OR  Anesthesiologist: Mert Apple MD  Performed: anesthesiologist     Indications and Patient Condition  Indications

## 2021-11-15 NOTE — ANESTHESIA PREPROCEDURE EVALUATION
Anesthesia PreOp Note    HPI:     Isael Julian is a 68year old male who presents for preoperative consultation requested by: Opal Sheikh MD    Date of Surgery: 11/15/2021    Procedure(s):  cystoscopy bilateral retrograde pyelograms, possible • Calculus of kidney    • Colon polyps     c-scopy    • Dry eye    • Ectopic kidney     left   • Gout    • Hematuria     sees Dr. Marcela Biggs   • Kidney stones     per [employer] (EMA): \"Righ pelvis w/ small kidney stones\"   • Lumbar spinal sten 11/12/2021  Vitamin D3 2000 UNITS Oral Cap, Take 4,000 Units by mouth daily. , Disp: , Rfl: , 11/12/2021  Vitamin B-1 100 MG Oral Tab, Take 100 mg by mouth daily. , Disp: , Rfl: , 11/12/2021      lactated ringers infusion, , Intravenous, Continuous, [place], in a home with stairs.     Social Determinants of Health  Financial Resource Strain: Not on file  Food Insecurity: Not on file  Transportation Needs: Not on file  Physical Activity: Not on file  Stress: Not on file  Social Connections: Not on file  Intimate

## 2021-11-16 NOTE — OPERATIVE REPORT
[facility]    PATIENT'S NAME: Eloisa Gomez   ATTENDING PHYSICIAN: Anna Payton MD   OPERATING PHYSICIAN: Anna Payton MD   PATIENT ACCOUNT#:   [de-identified]    LOCATION:  [place] [address]  MEDICAL RECORD #:   [id number] preoperatively. He was given general anesthesia. He was carefully prepped and draped in the usual sterile fashion in semi-lithotomy position.  x-rays were done which showed no calcifications overlying the ureters or kidneys.   Next, we attempted to in the right pelvis. Nothing on today's exam would explain the patient's microscopic hematuria other than some prostatic calcifications. We will see the patient in 3 months. He will call if he has problems.     Dictated By Po Hansen MD  d: 11/15

## 2022-01-10 ENCOUNTER — LAB ENCOUNTER (OUTPATIENT)
Dept: LAB | Age: 77
End: 2022-01-10
Attending: INTERNAL MEDICINE
Payer: COMMERCIAL

## 2022-01-10 DIAGNOSIS — Z12.5 SPECIAL SCREENING, PROSTATE CANCER: ICD-10-CM

## 2022-01-10 DIAGNOSIS — N13.9 OBSTRUCTION, UROPATHY: ICD-10-CM

## 2022-01-10 DIAGNOSIS — Z00.01 ENCOUNTER FOR GENERAL ADULT MEDICAL EXAMINATION WITH ABNORMAL FINDINGS: Primary | ICD-10-CM

## 2022-01-10 LAB
ALBUMIN SERPL-MCNC: 3.9 G/DL (ref 3.4–5)
ALBUMIN/GLOB SERPL: 1.3 {RATIO} (ref 1–2)
ALP LIVER SERPL-CCNC: 69 U/L
ALT SERPL-CCNC: 31 U/L
ANION GAP SERPL CALC-SCNC: 5 MMOL/L (ref 0–18)
AST SERPL-CCNC: 27 U/L (ref 15–37)
BASOPHILS # BLD AUTO: 0.06 X10(3) UL (ref 0–0.2)
BASOPHILS NFR BLD AUTO: 0.9 %
BILIRUB SERPL-MCNC: 0.5 MG/DL (ref 0.1–2)
BILIRUB UR QL: NEGATIVE
BUN BLD-MCNC: 25 MG/DL (ref 7–18)
BUN/CREAT SERPL: 22.7 (ref 10–20)
CALCIUM BLD-MCNC: 10 MG/DL (ref 8.5–10.1)
CHLORIDE SERPL-SCNC: 106 MMOL/L (ref 98–112)
CHOLEST SERPL-MCNC: 188 MG/DL (ref ?–200)
CLARITY UR: CLEAR
CO2 SERPL-SCNC: 28 MMOL/L (ref 21–32)
COLOR UR: YELLOW
COMPLEXED PSA SERPL-MCNC: 2.62 NG/ML (ref ?–4)
CREAT BLD-MCNC: 1.1 MG/DL
DEPRECATED RDW RBC AUTO: 43 FL (ref 35.1–46.3)
EOSINOPHIL # BLD AUTO: 0.15 X10(3) UL (ref 0–0.7)
EOSINOPHIL NFR BLD AUTO: 2.4 %
ERYTHROCYTE [DISTWIDTH] IN BLOOD BY AUTOMATED COUNT: 12.8 % (ref 11–15)
EST. AVERAGE GLUCOSE BLD GHB EST-MCNC: 108 MG/DL (ref 68–126)
FASTING PATIENT LIPID ANSWER: YES
FASTING STATUS PATIENT QL REPORTED: YES
GLOBULIN PLAS-MCNC: 3.1 G/DL (ref 2.8–4.4)
GLUCOSE BLD-MCNC: 93 MG/DL (ref 70–99)
GLUCOSE UR-MCNC: NEGATIVE MG/DL
HBA1C MFR BLD: 5.4 % (ref ?–5.7)
HCT VFR BLD AUTO: 49.3 %
HDLC SERPL-MCNC: 65 MG/DL (ref 40–59)
HGB BLD-MCNC: 15.9 G/DL
HGB UR QL STRIP.AUTO: NEGATIVE
IMM GRANULOCYTES # BLD AUTO: 0.02 X10(3) UL (ref 0–1)
IMM GRANULOCYTES NFR BLD: 0.3 %
KETONES UR-MCNC: NEGATIVE MG/DL
LDLC SERPL CALC-MCNC: 112 MG/DL (ref ?–100)
LEUKOCYTE ESTERASE UR QL STRIP.AUTO: NEGATIVE
LYMPHOCYTES # BLD AUTO: 1.83 X10(3) UL (ref 1–4)
LYMPHOCYTES NFR BLD AUTO: 28.8 %
MCH RBC QN AUTO: 29.7 PG (ref 26–34)
MCHC RBC AUTO-ENTMCNC: 32.3 G/DL (ref 31–37)
MCV RBC AUTO: 92 FL
MONOCYTES # BLD AUTO: 0.64 X10(3) UL (ref 0.1–1)
MONOCYTES NFR BLD AUTO: 10.1 %
NEUTROPHILS # BLD AUTO: 3.66 X10 (3) UL (ref 1.5–7.7)
NEUTROPHILS # BLD AUTO: 3.66 X10(3) UL (ref 1.5–7.7)
NEUTROPHILS NFR BLD AUTO: 57.5 %
NITRITE UR QL STRIP.AUTO: NEGATIVE
NONHDLC SERPL-MCNC: 123 MG/DL (ref ?–130)
OSMOLALITY SERPL CALC.SUM OF ELEC: 292 MOSM/KG (ref 275–295)
PH UR: 5 [PH] (ref 5–8)
PLATELET # BLD AUTO: 196 10(3)UL (ref 150–450)
POTASSIUM SERPL-SCNC: 5.7 MMOL/L (ref 3.5–5.1)
PROT SERPL-MCNC: 7 G/DL (ref 6.4–8.2)
PROT UR-MCNC: NEGATIVE MG/DL
PSA SERPL-MCNC: 2.62 NG/ML (ref ?–4)
RBC # BLD AUTO: 5.36 X10(6)UL
SODIUM SERPL-SCNC: 139 MMOL/L (ref 136–145)
SP GR UR STRIP: 1.01 (ref 1–1.03)
TRIGL SERPL-MCNC: 58 MG/DL (ref 30–149)
TSI SER-ACNC: 1.23 MIU/ML (ref 0.36–3.74)
UROBILINOGEN UR STRIP-ACNC: <2
VLDLC SERPL CALC-MCNC: 10 MG/DL (ref 0–30)
WBC # BLD AUTO: 6.4 X10(3) UL (ref 4–11)

## 2022-01-10 PROCEDURE — 36415 COLL VENOUS BLD VENIPUNCTURE: CPT

## 2022-01-10 PROCEDURE — 83036 HEMOGLOBIN GLYCOSYLATED A1C: CPT

## 2022-01-10 PROCEDURE — 81003 URINALYSIS AUTO W/O SCOPE: CPT

## 2022-01-10 PROCEDURE — 84443 ASSAY THYROID STIM HORMONE: CPT

## 2022-01-10 PROCEDURE — 85025 COMPLETE CBC W/AUTO DIFF WBC: CPT

## 2022-01-10 PROCEDURE — 84153 ASSAY OF PSA TOTAL: CPT

## 2022-01-10 PROCEDURE — 80061 LIPID PANEL: CPT

## 2022-01-10 PROCEDURE — 80053 COMPREHEN METABOLIC PANEL: CPT

## 2022-02-10 ENCOUNTER — HOSPITAL ENCOUNTER (OUTPATIENT)
Dept: ULTRASOUND IMAGING | Facility: HOSPITAL | Age: 77
Discharge: HOME | End: 2022-02-10
Attending: INTERNAL MEDICINE
Payer: COMMERCIAL

## 2022-02-10 ENCOUNTER — HOSPITAL ENCOUNTER (OUTPATIENT)
Dept: MAMMOGRAPHY | Facility: HOSPITAL | Age: 77
Discharge: HOME | End: 2022-02-10
Attending: INTERNAL MEDICINE
Payer: COMMERCIAL

## 2022-02-10 DIAGNOSIS — N63.20 MASSES OF BOTH BREASTS: ICD-10-CM

## 2022-02-10 DIAGNOSIS — N63.10 MASSES OF BOTH BREASTS: ICD-10-CM

## 2022-02-10 PROCEDURE — 77066 DX MAMMO INCL CAD BI: CPT | Performed by: INTERNAL MEDICINE

## 2022-02-10 PROCEDURE — 77062 BREAST TOMOSYNTHESIS BI: CPT | Performed by: INTERNAL MEDICINE

## 2022-02-10 PROCEDURE — 76642 ULTRASOUND BREAST LIMITED: CPT | Performed by: INTERNAL MEDICINE

## 2022-03-17 ENCOUNTER — OFFICE VISIT (OUTPATIENT)
Dept: NEUROLOGY | Facility: CLINIC | Age: 77
End: 2022-03-17
Payer: COMMERCIAL

## 2022-03-17 VITALS
SYSTOLIC BLOOD PRESSURE: 140 MMHG | WEIGHT: 248 LBS | HEIGHT: 65 IN | OXYGEN SATURATION: 97 % | DIASTOLIC BLOOD PRESSURE: 78 MMHG | BODY MASS INDEX: 41.32 KG/M2 | HEART RATE: 58 BPM

## 2022-03-17 DIAGNOSIS — G95.89 MYELOMALACIA (HCC): Primary | ICD-10-CM

## 2022-03-17 DIAGNOSIS — G56.03 BILATERAL CARPAL TUNNEL SYNDROME: ICD-10-CM

## 2022-03-17 PROCEDURE — 3078F DIAST BP <80 MM HG: CPT | Performed by: OTHER

## 2022-03-17 PROCEDURE — 99204 OFFICE O/P NEW MOD 45 MIN: CPT | Performed by: OTHER

## 2022-03-17 PROCEDURE — 3077F SYST BP >= 140 MM HG: CPT | Performed by: OTHER

## 2022-03-17 PROCEDURE — 3008F BODY MASS INDEX DOCD: CPT | Performed by: OTHER

## 2022-03-17 NOTE — PROGRESS NOTES
Mr. Tonya Jimenez, relates a several year history of bilateral ulnar forearm pain, paresthesias in the hands, ulnar greater than median distribution. Left rotator cuff dysfunction. Long history of cervical spine pain. Denies any radiating pain between the shoulder and the elbows. Denies lower extremity weakness. No bowel bladder dysfunction. He has been evaluated by rheumatologist, Dr. Amanda Olivera who ordered MRI of the cervical spine last year. He was recently evaluated by neurosurgeon at [facility] and is scheduled to follow-up with his neurosurgeon. Is also been diagnosed with pseudogout. I reviewed the MRI cervical spine images with the patient. He has had cervical, lumbar spine surgery. Epic records, labs, physicians notes reviewed    Review of system -12 fields except for arthralgias. Exam: Pupils react to light accommodation. Optic disc flat. Visual fields are full. Cranial nerves normal.  Speech and language intact. Strength in the upper, lower extremities is normal except for bilateral intrinsic hand weakness right cranial left. Deep tendon reflexes +1 symmetric in upper extremities +2 symmetric in the lower extremities without Babinski signs. No ankle clonus. Tone in the arms and legs normal.  Joint position sense vibration in the hands and feet are normal.  Finger-nose normal.  Gait is normal.    Impression: Cervical myelopathy. Will obtain an EMG of bilateral UE to evaluate associate carpal tunnel syndrome, ulnar neuropathy. Weakness is maximum in the intrinsic ulnar muscles right greater than left. He states he will call the neurosurgery Monday if he is not heard from them. Neurosurgeon is waiting to review MRI of the cervical spine images. Although await for the EMG nerve conduction study, my clinical impression is that he will need to proceed with C-spine decompressive surgery.

## 2022-05-16 ENCOUNTER — PROCEDURE VISIT (OUTPATIENT)
Dept: NEUROLOGY | Facility: CLINIC | Age: 77
End: 2022-05-16
Payer: COMMERCIAL

## 2022-05-16 DIAGNOSIS — M54.12 CERVICAL RADICULOPATHY: ICD-10-CM

## 2022-05-16 DIAGNOSIS — G95.89 MYELOMALACIA (HCC): Primary | ICD-10-CM

## 2022-05-16 PROCEDURE — 95886 MUSC TEST DONE W/N TEST COMP: CPT | Performed by: OTHER

## 2022-05-16 PROCEDURE — 95910 NRV CNDJ TEST 7-8 STUDIES: CPT | Performed by: OTHER

## 2022-05-17 ENCOUNTER — TELEPHONE (OUTPATIENT)
Dept: NEUROLOGY | Facility: CLINIC | Age: 77
End: 2022-05-17

## 2022-05-17 NOTE — TELEPHONE ENCOUNTER
Please tell patient EMG test reveals that the weakness in the arms and hands is emanating from the cervical spine and then he needs to follow-up with the neurosurgeon as we discussed yesterday.

## 2022-05-17 NOTE — TELEPHONE ENCOUNTER
Called & spoke to patient to notify of message from Dr Shar Velez as noted below. Patient verbalized understanding & plans to follow-up with neurosurgery as advised. Patient requests copy of EMG report be sent to address on file.      Result printed & sent to patient

## 2022-07-11 ENCOUNTER — OFFICE VISIT (OUTPATIENT)
Dept: RHEUMATOLOGY | Facility: CLINIC | Age: 77
End: 2022-07-11
Payer: COMMERCIAL

## 2022-07-11 VITALS
OXYGEN SATURATION: 97 % | SYSTOLIC BLOOD PRESSURE: 130 MMHG | HEIGHT: 65 IN | WEIGHT: 227 LBS | TEMPERATURE: 99 F | DIASTOLIC BLOOD PRESSURE: 80 MMHG | HEART RATE: 60 BPM | BODY MASS INDEX: 37.82 KG/M2

## 2022-07-11 DIAGNOSIS — Z98.1 HISTORY OF FUSION OF CERVICAL SPINE: ICD-10-CM

## 2022-07-11 DIAGNOSIS — M11.261 CHONDROCALCINOSIS OF BOTH KNEES: Primary | ICD-10-CM

## 2022-07-11 DIAGNOSIS — M11.262 CHONDROCALCINOSIS OF BOTH KNEES: Primary | ICD-10-CM

## 2022-07-11 DIAGNOSIS — Z87.39 H/O CALCIUM PYROPHOSPHATE DEPOSITION DISEASE (CPPD): ICD-10-CM

## 2022-07-11 DIAGNOSIS — M19.042 OSTEOARTHRITIS OF FINGERS OF BOTH HANDS: ICD-10-CM

## 2022-07-11 DIAGNOSIS — M48.061 SPINAL STENOSIS OF LUMBAR REGION WITHOUT NEUROGENIC CLAUDICATION: ICD-10-CM

## 2022-07-11 DIAGNOSIS — Z98.890 STATUS POST LUMBAR LAMINECTOMY: ICD-10-CM

## 2022-07-11 DIAGNOSIS — M19.041 OSTEOARTHRITIS OF FINGERS OF BOTH HANDS: ICD-10-CM

## 2022-07-11 PROCEDURE — 3008F BODY MASS INDEX DOCD: CPT | Performed by: INTERNAL MEDICINE

## 2022-07-11 PROCEDURE — 3079F DIAST BP 80-89 MM HG: CPT | Performed by: INTERNAL MEDICINE

## 2022-07-11 PROCEDURE — 99214 OFFICE O/P EST MOD 30 MIN: CPT | Performed by: INTERNAL MEDICINE

## 2022-07-11 PROCEDURE — 3075F SYST BP GE 130 - 139MM HG: CPT | Performed by: INTERNAL MEDICINE

## 2022-07-11 RX ORDER — COLCHICINE 0.6 MG/1
0.6 TABLET ORAL 2 TIMES DAILY
Qty: 180 TABLET | Refills: 3 | Status: SHIPPED | OUTPATIENT
Start: 2022-07-11

## 2022-07-11 RX ORDER — MELOXICAM 7.5 MG/1
7.5 TABLET ORAL 2 TIMES DAILY PRN
Qty: 60 TABLET | Refills: 5 | Status: SHIPPED | OUTPATIENT
Start: 2022-07-11

## 2022-07-11 NOTE — PATIENT INSTRUCTIONS
Continue colchicine 0.6 mg twice a day. Try to lower to one per day if possible.'  Use extra strength Tylenol 1 or 2 tablets twice a day as needed for osteoarthritis. If extra strength Tylenol does not help your aches and pains, then switch to meloxicam 7.5 mg once or twice a day. I am proud that you have lost 40 pounds. That see if you can get another 10 to 20 pounds off. Less weight means less stress on your back and knees. If your back continues to hurt you I recommend you rechecking with the [facility] neurosurgeon. Or you could see Dr. Cricket Moss and neurosurgeon at [facility]. Return to office for recheck 1 year.

## 2022-11-01 ENCOUNTER — HOSPITAL ENCOUNTER (OUTPATIENT)
Dept: GENERAL RADIOLOGY | Age: 77
Discharge: HOME | End: 2022-11-01
Attending: ORTHOPAEDIC SURGERY
Payer: COMMERCIAL

## 2022-11-01 ENCOUNTER — TELEPHONE (OUTPATIENT)
Dept: ORTHOPEDICS CLINIC | Facility: CLINIC | Age: 77
End: 2022-11-01

## 2022-11-01 DIAGNOSIS — M25.551 RIGHT HIP PAIN: Primary | ICD-10-CM

## 2022-11-01 DIAGNOSIS — M25.551 RIGHT HIP PAIN: ICD-10-CM

## 2022-11-01 PROCEDURE — 73502 X-RAY EXAM HIP UNI 2-3 VIEWS: CPT | Performed by: ORTHOPAEDIC SURGERY

## 2022-11-01 NOTE — TELEPHONE ENCOUNTER
Please enter an Xray RX this patient's RT HIP, as he states that he has pain from top of hip and goes down leg . Patient was instructed to get X-rays before appt. PLEASE REPLY TO THIS MESSAGE when the order is put in EPIC so that I can schedule the Xray appt about 15 minutes before his appointment. Thank you, in advance!     Future Appointments   Date Time Provider Remi Austin   11/2/2022  2:20 PM Thomas Hurley MD [facility]

## 2022-11-02 ENCOUNTER — OFFICE VISIT (OUTPATIENT)
Dept: ORTHOPEDICS CLINIC | Facility: CLINIC | Age: 77
End: 2022-11-02
Payer: COMMERCIAL

## 2022-11-02 VITALS — WEIGHT: 227 LBS | BODY MASS INDEX: 37.82 KG/M2 | HEIGHT: 65 IN

## 2022-11-02 DIAGNOSIS — M50.10 CERVICAL RADICULOPATHY DUE TO INTERVERTEBRAL DISC DISORDER: ICD-10-CM

## 2022-11-02 DIAGNOSIS — M70.61 TROCHANTERIC BURSITIS OF RIGHT HIP: Primary | ICD-10-CM

## 2022-11-02 PROCEDURE — 99214 OFFICE O/P EST MOD 30 MIN: CPT | Performed by: ORTHOPAEDIC SURGERY

## 2022-11-02 PROCEDURE — 3008F BODY MASS INDEX DOCD: CPT | Performed by: ORTHOPAEDIC SURGERY

## 2022-11-02 PROCEDURE — 20610 DRAIN/INJ JOINT/BURSA W/O US: CPT | Performed by: ORTHOPAEDIC SURGERY

## 2022-11-02 RX ORDER — TRIAMCINOLONE ACETONIDE 40 MG/ML
40 INJECTION, SUSPENSION INTRA-ARTICULAR; INTRAMUSCULAR ONCE
Status: COMPLETED | OUTPATIENT
Start: 2022-11-02 | End: 2022-11-02

## 2022-11-02 RX ADMIN — TRIAMCINOLONE ACETONIDE 40 MG: 40 INJECTION, SUSPENSION INTRA-ARTICULAR; INTRAMUSCULAR at 15:12:00

## 2022-11-02 NOTE — PROGRESS NOTES
Patient is a 66-year-old white male here for primarily his right hip but also asking for some advice regarding his cervical spine. Patient has been recommended to consider surgery for his neck and was wanting to know if I had an opinion regarding his status. I did review his cervical spine x-ray and MRI reports. The patient does note he has radiating pain into the left arm. I did explain to the patient that he had substantial encroachment upon the spinal cord and narrowing in the cervical spine region which would suggest based upon his symptoms that surgery be appropriate to reduce his risk of having a more catastrophic event from a fall. Patient also notes that his right hip bothers him more so when he is getting up from a sitting position. He does have some pain when he is lying down. Patient denies groin pain. Patient's exam shows him to have intact motor of the upper extremities. Handgrip strength grossly 5/5. Sensory exam intact light touch all fingers both hands. Exam of the right hip shows him to have minimal pain with passive range of motion. He does have moderate tenderness over the trochanteric bursal region of the right hip. Patient's nonantalgic gait. Trendelenburg gait is negative. Impression is that of degenerative disc disease cervical spine with intermittent radiculopathy of the right upper extremity. Second impression is that of trochanteric bursitis of the right hip. Explained to the patient that I feel it is appropriate for him to be looking into advice regarding surgery from a couple of different neck surgeons. Regarding the right hip advise going ahead with a cortisone injection which was done under sterile technique with 2 cc Xylocaine and Marcaine and 40 mg of Kenalog into the bursa without difficulties. Patient tolerated the injection well.

## 2022-12-07 ENCOUNTER — HOSPITAL ENCOUNTER (OUTPATIENT)
Age: 77
Discharge: HOME | End: 2022-12-07
Payer: COMMERCIAL

## 2022-12-07 VITALS
OXYGEN SATURATION: 98 % | RESPIRATION RATE: 16 BRPM | TEMPERATURE: 99 F | SYSTOLIC BLOOD PRESSURE: 150 MMHG | DIASTOLIC BLOOD PRESSURE: 79 MMHG | HEART RATE: 63 BPM

## 2022-12-07 DIAGNOSIS — Z20.822 ENCOUNTER FOR LABORATORY TESTING FOR COVID-19 VIRUS: ICD-10-CM

## 2022-12-07 DIAGNOSIS — U07.1 COVID-19: Primary | ICD-10-CM

## 2022-12-07 LAB
POCT INFLUENZA A: NEGATIVE
POCT INFLUENZA B: NEGATIVE
SARS-COV-2 RNA RESP QL NAA+PROBE: DETECTED

## 2022-12-07 PROCEDURE — U0002 COVID-19 LAB TEST NON-CDC: HCPCS | Performed by: NURSE PRACTITIONER

## 2022-12-07 PROCEDURE — 99213 OFFICE O/P EST LOW 20 MIN: CPT | Performed by: NURSE PRACTITIONER

## 2022-12-07 PROCEDURE — 87502 INFLUENZA DNA AMP PROBE: CPT | Performed by: NURSE PRACTITIONER

## 2022-12-08 NOTE — DISCHARGE INSTRUCTIONS
Social isolation as this is the recommendation of the CDC. If you develop worsening symptoms such as chest pain shortness of breath nausea vomiting or diarrhea severe headache or high fever go to the emergency department. For mild symptoms such as body aches chills slight headache low-grade temp take Tylenol. Hydrate well but do not over hydrate and eat food as tolerated.

## 2023-06-08 ENCOUNTER — LAB ENCOUNTER (OUTPATIENT)
Dept: LAB | Age: 78
End: 2023-06-08
Attending: INTERNAL MEDICINE
Payer: COMMERCIAL

## 2023-06-08 DIAGNOSIS — N13.9 OBSTRUCTIVE UROPATHY: ICD-10-CM

## 2023-06-08 DIAGNOSIS — Z12.5 SCREENING PSA (PROSTATE SPECIFIC ANTIGEN): Primary | ICD-10-CM

## 2023-06-08 DIAGNOSIS — Z00.01 ENCOUNTER FOR GENERAL ADULT MEDICAL EXAMINATION WITH ABNORMAL FINDINGS: ICD-10-CM

## 2023-06-08 LAB
ALBUMIN SERPL-MCNC: 3.3 G/DL (ref 3.4–5)
ALBUMIN/GLOB SERPL: 0.9 {RATIO} (ref 1–2)
ALP LIVER SERPL-CCNC: 76 U/L
ALT SERPL-CCNC: 35 U/L
ANION GAP SERPL CALC-SCNC: 3 MMOL/L (ref 0–18)
AST SERPL-CCNC: 30 U/L (ref 15–37)
BASOPHILS # BLD AUTO: 0.06 X10(3) UL (ref 0–0.2)
BASOPHILS NFR BLD AUTO: 1 %
BILIRUB SERPL-MCNC: 0.6 MG/DL (ref 0.1–2)
BILIRUB UR QL: NEGATIVE
BUN BLD-MCNC: 23 MG/DL (ref 7–18)
BUN/CREAT SERPL: 25 (ref 10–20)
CALCIUM BLD-MCNC: 9.3 MG/DL (ref 8.5–10.1)
CHLORIDE SERPL-SCNC: 107 MMOL/L (ref 98–112)
CHOLEST SERPL-MCNC: 170 MG/DL (ref ?–200)
CLARITY UR: CLEAR
CO2 SERPL-SCNC: 30 MMOL/L (ref 21–32)
COLOR UR: COLORLESS
COMPLEXED PSA SERPL-MCNC: 1.32 NG/ML (ref ?–4)
CREAT BLD-MCNC: 0.92 MG/DL
DEPRECATED RDW RBC AUTO: 41.3 FL (ref 35.1–46.3)
EOSINOPHIL # BLD AUTO: 0.23 X10(3) UL (ref 0–0.7)
EOSINOPHIL NFR BLD AUTO: 3.9 %
ERYTHROCYTE [DISTWIDTH] IN BLOOD BY AUTOMATED COUNT: 12.7 % (ref 11–15)
EST. AVERAGE GLUCOSE BLD GHB EST-MCNC: 103 MG/DL (ref 68–126)
FASTING PATIENT LIPID ANSWER: YES
FASTING STATUS PATIENT QL REPORTED: YES
GFR SERPLBLD BASED ON 1.73 SQ M-ARVRAT: 86 ML/MIN/1.73M2 (ref 60–?)
GLOBULIN PLAS-MCNC: 3.5 G/DL (ref 2.8–4.4)
GLUCOSE BLD-MCNC: 83 MG/DL (ref 70–99)
GLUCOSE UR-MCNC: NORMAL MG/DL
HBA1C MFR BLD: 5.2 % (ref ?–5.7)
HCT VFR BLD AUTO: 47 %
HDLC SERPL-MCNC: 58 MG/DL (ref 40–59)
HGB BLD-MCNC: 15.3 G/DL
HGB UR QL STRIP.AUTO: NEGATIVE
IMM GRANULOCYTES # BLD AUTO: 0.01 X10(3) UL (ref 0–1)
IMM GRANULOCYTES NFR BLD: 0.2 %
KETONES UR-MCNC: NEGATIVE MG/DL
LDLC SERPL CALC-MCNC: 98 MG/DL (ref ?–100)
LEUKOCYTE ESTERASE UR QL STRIP.AUTO: NEGATIVE
LYMPHOCYTES # BLD AUTO: 1.83 X10(3) UL (ref 1–4)
LYMPHOCYTES NFR BLD AUTO: 31.3 %
MCH RBC QN AUTO: 28.9 PG (ref 26–34)
MCHC RBC AUTO-ENTMCNC: 32.6 G/DL (ref 31–37)
MCV RBC AUTO: 88.8 FL
MONOCYTES # BLD AUTO: 0.52 X10(3) UL (ref 0.1–1)
MONOCYTES NFR BLD AUTO: 8.9 %
NEUTROPHILS # BLD AUTO: 3.2 X10 (3) UL (ref 1.5–7.7)
NEUTROPHILS # BLD AUTO: 3.2 X10(3) UL (ref 1.5–7.7)
NEUTROPHILS NFR BLD AUTO: 54.7 %
NITRITE UR QL STRIP.AUTO: NEGATIVE
NONHDLC SERPL-MCNC: 112 MG/DL (ref ?–130)
OSMOLALITY SERPL CALC.SUM OF ELEC: 293 MOSM/KG (ref 275–295)
PH UR: 5.5 [PH] (ref 5–8)
PLATELET # BLD AUTO: 200 10(3)UL (ref 150–450)
POTASSIUM SERPL-SCNC: 4.5 MMOL/L (ref 3.5–5.1)
PROT SERPL-MCNC: 6.8 G/DL (ref 6.4–8.2)
PROT UR-MCNC: NEGATIVE MG/DL
PSA SERPL-MCNC: 1.32 NG/ML (ref ?–4)
RBC # BLD AUTO: 5.29 X10(6)UL
SODIUM SERPL-SCNC: 140 MMOL/L (ref 136–145)
SP GR UR STRIP: 1.01 (ref 1–1.03)
TRIGL SERPL-MCNC: 71 MG/DL (ref 30–149)
TSI SER-ACNC: 1.72 MIU/ML (ref 0.36–3.74)
UROBILINOGEN UR STRIP-ACNC: NORMAL
VIT D+METAB SERPL-MCNC: 39.5 NG/ML (ref 30–100)
VLDLC SERPL CALC-MCNC: 12 MG/DL (ref 0–30)
WBC # BLD AUTO: 5.9 X10(3) UL (ref 4–11)

## 2023-06-08 PROCEDURE — 84443 ASSAY THYROID STIM HORMONE: CPT

## 2023-06-08 PROCEDURE — 85025 COMPLETE CBC W/AUTO DIFF WBC: CPT

## 2023-06-08 PROCEDURE — 80053 COMPREHEN METABOLIC PANEL: CPT

## 2023-06-08 PROCEDURE — 36415 COLL VENOUS BLD VENIPUNCTURE: CPT

## 2023-06-08 PROCEDURE — 84153 ASSAY OF PSA TOTAL: CPT

## 2023-06-08 PROCEDURE — 83036 HEMOGLOBIN GLYCOSYLATED A1C: CPT

## 2023-06-08 PROCEDURE — 82306 VITAMIN D 25 HYDROXY: CPT

## 2023-06-08 PROCEDURE — 80061 LIPID PANEL: CPT

## 2023-07-03 NOTE — TELEPHONE ENCOUNTER
Future Appointments   Date Time Provider Remi Austin   6/43/9620 31:32 AM Joe Vera MD [facility]     LOV  7/11/2022    Last refill  7/11/2022  180 tabs, 3 refills

## 2023-07-05 RX ORDER — COLCHICINE 0.6 MG/1
0.6 TABLET ORAL 2 TIMES DAILY
Qty: 180 TABLET | Refills: 3 | Status: SHIPPED | OUTPATIENT
Start: 2023-07-05

## 2023-07-11 ENCOUNTER — OFFICE VISIT (OUTPATIENT)
Dept: RHEUMATOLOGY | Facility: CLINIC | Age: 78
End: 2023-07-11
Payer: COMMERCIAL

## 2023-07-11 VITALS
WEIGHT: 225 LBS | SYSTOLIC BLOOD PRESSURE: 138 MMHG | DIASTOLIC BLOOD PRESSURE: 74 MMHG | RESPIRATION RATE: 16 BRPM | OXYGEN SATURATION: 98 % | HEART RATE: 60 BPM | HEIGHT: 65 IN | TEMPERATURE: 98 F | BODY MASS INDEX: 37.49 KG/M2

## 2023-07-11 DIAGNOSIS — M11.261 CHONDROCALCINOSIS OF BOTH KNEES: ICD-10-CM

## 2023-07-11 DIAGNOSIS — M19.042 OSTEOARTHRITIS OF FINGERS OF BOTH HANDS: ICD-10-CM

## 2023-07-11 DIAGNOSIS — M18.0 OSTEOARTHRITIS OF CARPOMETACARPAL (CMC) JOINT OF BOTH THUMBS: ICD-10-CM

## 2023-07-11 DIAGNOSIS — M11.262 CHONDROCALCINOSIS OF BOTH KNEES: ICD-10-CM

## 2023-07-11 DIAGNOSIS — Z98.890 STATUS POST LUMBAR LAMINECTOMY: ICD-10-CM

## 2023-07-11 DIAGNOSIS — M19.041 OSTEOARTHRITIS OF FINGERS OF BOTH HANDS: ICD-10-CM

## 2023-07-11 DIAGNOSIS — Z98.1 S/P CERVICAL SPINAL FUSION: Primary | ICD-10-CM

## 2023-07-11 DIAGNOSIS — Z87.39 H/O CALCIUM PYROPHOSPHATE DEPOSITION DISEASE (CPPD): ICD-10-CM

## 2023-07-11 PROCEDURE — 99214 OFFICE O/P EST MOD 30 MIN: CPT | Performed by: INTERNAL MEDICINE

## 2023-07-11 PROCEDURE — 3075F SYST BP GE 130 - 139MM HG: CPT | Performed by: INTERNAL MEDICINE

## 2023-07-11 PROCEDURE — 3008F BODY MASS INDEX DOCD: CPT | Performed by: INTERNAL MEDICINE

## 2023-07-11 PROCEDURE — 3078F DIAST BP <80 MM HG: CPT | Performed by: INTERNAL MEDICINE

## 2023-07-11 NOTE — PATIENT INSTRUCTIONS
Continue colchicine 0.6 mg twice a day for treatment of chondrocalcinosis which affects her knees and wrists. You can use X strength Tylenol 500 mg once or twice a day in addition if needed. Rare use of meloxicam 7.5 mg. The burning sensation you have in your left forearm left hand most likely a sign of a pinched nerve in your neck level C67. See your spinal surgeon about that. Also I heard you are planning to do a EMG which is a test I will check to see if there is a pinched nerve. Try to lose some weight by following a low-fat low-cholesterol low calorie diet. Continue to walk for exercise. Return to office for recheck 1 yearRTO 1 year.

## 2023-10-20 ENCOUNTER — HOSPITAL ENCOUNTER (OUTPATIENT)
Dept: GENERAL RADIOLOGY | Facility: HOSPITAL | Age: 78
Discharge: HOME | End: 2023-10-20
Attending: INTERNAL MEDICINE

## 2023-10-20 DIAGNOSIS — R05.9 COUGH: ICD-10-CM

## 2023-10-20 PROCEDURE — 71046 X-RAY EXAM CHEST 2 VIEWS: CPT | Performed by: INTERNAL MEDICINE

## 2023-12-22 ENCOUNTER — LAB ENCOUNTER (OUTPATIENT)
Dept: LAB | Facility: HOSPITAL | Age: 78
End: 2023-12-22
Attending: INTERNAL MEDICINE
Payer: COMMERCIAL

## 2023-12-22 DIAGNOSIS — R60.0 LOCALIZED EDEMA: Primary | ICD-10-CM

## 2023-12-22 LAB — BNP SERPL-MCNC: 41 PG/ML

## 2023-12-22 PROCEDURE — 36415 COLL VENOUS BLD VENIPUNCTURE: CPT

## 2023-12-22 PROCEDURE — 83880 ASSAY OF NATRIURETIC PEPTIDE: CPT

## 2024-01-09 ENCOUNTER — OFFICE VISIT (OUTPATIENT)
Dept: OTOLARYNGOLOGY | Facility: CLINIC | Age: 79
End: 2024-01-09

## 2024-01-09 VITALS — WEIGHT: 227 LBS | BODY MASS INDEX: 37.82 KG/M2 | HEIGHT: 65 IN

## 2024-01-09 DIAGNOSIS — J30.9 ALLERGIC RHINITIS WITH POSTNASAL DRIP: ICD-10-CM

## 2024-01-09 DIAGNOSIS — R06.2 WHEEZING: ICD-10-CM

## 2024-01-09 DIAGNOSIS — Z87.891 FORMER SMOKER: ICD-10-CM

## 2024-01-09 DIAGNOSIS — R09.82 ALLERGIC RHINITIS WITH POSTNASAL DRIP: ICD-10-CM

## 2024-01-09 DIAGNOSIS — H93.12 LEFT-SIDED TINNITUS: Primary | ICD-10-CM

## 2024-01-09 PROCEDURE — 31231 NASAL ENDOSCOPY DX: CPT | Performed by: SPECIALIST

## 2024-01-09 PROCEDURE — 99243 OFF/OP CNSLTJ NEW/EST LOW 30: CPT | Performed by: SPECIALIST

## 2024-01-09 PROCEDURE — 3008F BODY MASS INDEX DOCD: CPT | Performed by: SPECIALIST

## 2024-01-09 RX ORDER — MONTELUKAST SODIUM 10 MG/1
10 TABLET ORAL NIGHTLY
Qty: 30 TABLET | Refills: 5 | Status: SHIPPED | OUTPATIENT
Start: 2024-01-09

## 2024-01-09 RX ORDER — AZELASTINE 1 MG/ML
2 SPRAY, METERED NASAL 2 TIMES DAILY
Qty: 30 ML | Refills: 5 | Status: SHIPPED | OUTPATIENT
Start: 2024-01-09

## 2024-01-09 NOTE — PROGRESS NOTES
Dusty Hills Jr. is a 78 year old male.   Chief Complaint   Patient presents with    Ringing In Ear     Ringing in ears; left ear more than right    Cough     Coughing fits    Nose Problem     Stuffy nose and complains of possible nasal drip     HPI:   Patient with new onset of wheezing and postnasal drip.  Also has left tinnitus.  Did work in noise in the past, as a .    Current Outpatient Medications   Medication Sig Dispense Refill    azelastine 0.1 % Nasal Solution 2 sprays by Nasal route 2 (two) times daily. 30 mL 5    montelukast 10 MG Oral Tab Take 1 tablet (10 mg total) by mouth nightly. 30 tablet 5    colchicine 0.6 MG Oral Tab Take 1 tablet (0.6 mg total) by mouth 2 (two) times daily. 180 tablet 3    Meloxicam 7.5 MG Oral Tab Take 1 tablet (7.5 mg total) by mouth 2 (two) times daily as needed for Pain. 60 tablet 5    Lifitegrast (XIIDRA) 5 % Ophthalmic Solution Place 1 drop into both eyes daily.      Vit C-Cholecalciferol-Marychuy Hip 500-1000-20 MG-UNIT-MG Oral Cap Take 500 mg by mouth daily.      Finasteride (PROSCAR OR) Take 5 mg by mouth every morning.      Vitamin D3 2000 UNITS Oral Cap Take 2 capsules (4,000 Units total) by mouth daily.      Vitamin B-1 100 MG Oral Tab Take 1 tablet (100 mg total) by mouth daily.        Past Medical History:   Diagnosis Date    Back problem     BPH (benign prostatic hyperplasia)     Calculus of kidney     Colon polyps     c-scopy     Dry eye     Ectopic kidney     left    Gout     Hematuria     sees Dr. Cabrera    Kidney stones     per [employer] (EMA): \"Righ pelvis w/ small kidney stones\"    Lumbar spinal stenosis 2012    s/p lumbar laminectomy L2-L5 - Dr Hernandez @ [facility]    Osteoarthritis     Pneumonia 6/29/15    Pneumonia due to organism     Rheumatoid arthritis (HCC) 2008    Rosacea     Rotator cuff tear, left 2008    Visual impairment     readers      Social History:  Social History     Socioeconomic History    Marital status:     Tobacco Use    Smoking status: Former     Packs/day: 1.00     Years: 30.00     Additional pack years: 0.00     Total pack years: 30.00     Types: Cigarettes     Quit date: 6/15/1989     Years since quittin.5    Smokeless tobacco: Never   Vaping Use    Vaping Use: Never used   Substance and Sexual Activity    Alcohol use: Not Currently     Alcohol/week: 2.5 standard drinks of alcohol     Types: 3 Glasses of wine per week    Drug use: Never   Other Topics Concern    Caffeine Concern Yes     Comment: Coffee 2 cups daily   Social History Narrative    The patient does not use an assistive device..      The patient does live in a home with stairs.        REVIEW OF SYSTEMS:   GENERAL HEALTH: feels well otherwise  GENERAL : denies fever, chills, sweats, weight loss, weight gain  SKIN: denies any unusual skin lesions or rashes  RESPIRATORY: denies shortness of breath with exertion  NEURO: denies headaches    EXAM:   Ht 5' 5\" (1.651 m)   Wt 227 lb (103 kg)   BMI 37.77 kg/m²   System Details   Skin Inspection - Normal.   Constitutional Overall appearance - Normal.   Head/Face Facial features - Normal. Eyebrows - Normal. Skull - Normal.   Eyes Conjunctiva - Right: Normal, Left: Normal. Pupil - Right: Normal, Left: Normal.    Ears Inspection - Right: Normal, Left: Normal.   Canal - Right: Normal, Left: Normal.   TM - Right: Normal, Left: Normal.   Nasal External nose - Normal.   Consent was obtained.  The nasal cavity was anesthetized with 1% neosynephrine and 4% lidocaine.  The bilateral nares were examined from the nasal vestibule to the nasopharynx.  Areas examined include the nasal floor, turbinates, superior, middle, and inferior meatus, no ethmoid recess, the nasal septum, eustation tube and nasopharynx.  All abnormalities are listed in the exam section.  Severe nasal congestion.  No polyps or infection seen.  Mucoid postnasal drainage.   Oral/Oropharynx Lips - Normal, Tonsils - Normal, Tongue - Normal    Neck  Exam Inspection - Normal. Palpation - Normal. Parotid gland - Normal. Thyroid gland - Normal.   Lymph Detail Submental. Submandibular. Anterior cervical. Posterior cervical. Supraclavicular all without enlargement   Lungs Wheezing all fields   Nasopharynx Normal by fiberoptic exam   Larynx Normal except for mucoid postnasal drainage.  No tumors or masses seen.  Normal vocal cord mobility.   Psychiatric Orientation - Oriented to time, place, person & situation. Appropriate mood and affect.   Neurological Memory - Normal. Cranial nerves - Cranial nerves II through XII grossly intact.     ASSESSMENT AND PLAN:   1. Left-sided tinnitus  Will get audiogram to better evaluate.  Patient with heavy occupational noise exposure.  - Audiology Referral - [place] ([facility])    2. Wheezing  All lung fields.  Patient placed on Singulair and pulmonary function test ordered.  Reviewed chest x-ray done 10/20/2023 which was within normal limits.  - Complete PFT w/Bronchodilator/Albuterol 2.5; Future    3. Allergic rhinitis with postnasal drip  Patient asked to stop Zyrtec and Flonase.  Placed on a trial of Astelin nasal spray.  Follow-up in 3 to 4 weeks time, sooner if problems.      The patient indicates understanding of these issues and agrees to the plan.      Mckenzie Hernandez MD  1/9/2024  12:51 PM

## 2024-01-09 NOTE — PATIENT INSTRUCTIONS
An audiogram was ordered to better evaluate your left tinnitus.  I placed you on Astelin nasal spray and Singulair for your mucoid postnasal drip and wheezing.  I also ordered a pulmonary function test.  Follow-up in 3 to 4 weeks time, sooner if problems.

## 2024-01-10 ENCOUNTER — TELEPHONE (OUTPATIENT)
Dept: OTOLARYNGOLOGY | Facility: CLINIC | Age: 79
End: 2024-01-10

## 2024-01-10 NOTE — TELEPHONE ENCOUNTER
Per pt asking if Dr. Hernandez can recommend outside facility for hearing test due to [facility]. Please advise thank you.

## 2024-01-11 NOTE — TELEPHONE ENCOUNTER
Spoke with patient, stated he was able to get on the wait list for a hearing test but was able to schedule an appointment next month. Did recommend to call his insurance for any outside facilities. Patient agreed.     Future Appointments   Date Time Provider Department Center   1/18/2024 11:15 AM [facility] PFT ROOM [facility] RT EM Main [place]   2/6/2024  9:00 AM Susan Sharpe Au.D [facility]   7/16/2024 10:20 AM Jani Guzman MD [facility]

## 2024-01-16 ENCOUNTER — OFFICE VISIT (OUTPATIENT)
Dept: AUDIOLOGY | Facility: CLINIC | Age: 79
End: 2024-01-16
Payer: COMMERCIAL

## 2024-01-16 DIAGNOSIS — H90.3 SENSORINEURAL HEARING LOSS, BILATERAL: Primary | ICD-10-CM

## 2024-01-16 PROCEDURE — 92567 TYMPANOMETRY: CPT | Performed by: AUDIOLOGIST

## 2024-01-16 PROCEDURE — 92557 COMPREHENSIVE HEARING TEST: CPT | Performed by: AUDIOLOGIST

## 2024-01-18 ENCOUNTER — HOSPITAL ENCOUNTER (OUTPATIENT)
Dept: RESPIRATORY THERAPY | Facility: HOSPITAL | Age: 79
Discharge: HOME | End: 2024-01-18
Attending: SPECIALIST
Payer: COMMERCIAL

## 2024-01-18 DIAGNOSIS — R06.2 WHEEZING: ICD-10-CM

## 2024-01-18 PROCEDURE — 94726 PLETHYSMOGRAPHY LUNG VOLUMES: CPT | Performed by: INTERNAL MEDICINE

## 2024-01-18 PROCEDURE — 94729 DIFFUSING CAPACITY: CPT | Performed by: INTERNAL MEDICINE

## 2024-01-18 PROCEDURE — 94060 EVALUATION OF WHEEZING: CPT | Performed by: INTERNAL MEDICINE

## 2024-01-19 ENCOUNTER — TELEPHONE (OUTPATIENT)
Dept: OTOLARYNGOLOGY | Facility: CLINIC | Age: 79
End: 2024-01-19

## 2024-01-19 ENCOUNTER — HOSPITAL ENCOUNTER (OUTPATIENT)
Age: 79
Discharge: HOME | End: 2024-01-19
Payer: COMMERCIAL

## 2024-01-19 VITALS
HEART RATE: 79 BPM | OXYGEN SATURATION: 98 % | TEMPERATURE: 99 F | DIASTOLIC BLOOD PRESSURE: 92 MMHG | SYSTOLIC BLOOD PRESSURE: 136 MMHG | RESPIRATION RATE: 18 BRPM

## 2024-01-19 DIAGNOSIS — H91.90 HEARING LOSS, UNSPECIFIED HEARING LOSS TYPE, UNSPECIFIED LATERALITY: Primary | ICD-10-CM

## 2024-01-19 DIAGNOSIS — U07.1 COVID-19: Primary | ICD-10-CM

## 2024-01-19 DIAGNOSIS — R05.9 COUGH: ICD-10-CM

## 2024-01-19 LAB — SARS-COV-2 RNA RESP QL NAA+PROBE: DETECTED

## 2024-01-19 PROCEDURE — 99213 OFFICE O/P EST LOW 20 MIN: CPT

## 2024-01-19 PROCEDURE — U0002 COVID-19 LAB TEST NON-CDC: HCPCS

## 2024-01-19 RX ORDER — BENZONATATE 100 MG/1
100 CAPSULE ORAL 3 TIMES DAILY PRN
Qty: 30 CAPSULE | Refills: 0 | Status: SHIPPED | OUTPATIENT
Start: 2024-01-19 | End: 2024-01-19

## 2024-01-19 RX ORDER — BENZONATATE 100 MG/1
100 CAPSULE ORAL 3 TIMES DAILY PRN
Qty: 30 CAPSULE | Refills: 0 | Status: SHIPPED | OUTPATIENT
Start: 2024-01-19 | End: 2024-02-18

## 2024-01-19 NOTE — ED PROVIDER NOTES
Patient Seen in: [facility] [place]      History     Chief Complaint   Patient presents with    Covid     Stated Complaint: Chills    Subjective:   Dusty is a 78-year-old male presenting to the immediate care requesting a prescription for Paxlovid.  Patient states that he took a COVID test at home today that was positive, he has had Paxlovid in the past and he would like to have it again.  States that he has had a significant workup for a cough that has been ongoing for the past couple of months with ENT, had pulmonary function testing done yesterday.  Patient states that yesterday evening the cough became significantly worse and he began having chills and subjective fevers.  Denies any chest pain, shortness of breath, abdominal pain or vomiting.  States that yesterday he also had an increase in nasal congestion and postnasal drip.  He is eating and drinking well and is well-hydrated.  He denies any other concerns or complaints.            Objective:   Past Medical History:   Diagnosis Date    Back problem     BPH (benign prostatic hyperplasia)     Calculus of kidney     Colon polyps     c-scopy     Dry eye     Ectopic kidney     left    Gout     Hematuria     sees Dr. Cabrera    Kidney stones     per [employer] (EMA): \"Righ pelvis w/ small kidney stones\"    Lumbar spinal stenosis 2012    s/p lumbar laminectomy L2-L5 - Dr Hernandez @ [facility]    Osteoarthritis     Pneumonia 6/29/15    Pneumonia due to organism     Rheumatoid arthritis (HCC) 2008    Rosacea     Rotator cuff tear, left 2008    Visual impairment     readers              No pertinent past surgical history.              No pertinent social history.            Review of Systems   HENT:  Positive for congestion, postnasal drip and rhinorrhea.    Respiratory:  Positive for cough.    All other systems reviewed and are negative.      Positive for stated complaint: Chills  Other systems are as noted in HPI.  Constitutional and vital signs reviewed.       All other systems reviewed and negative except as noted above.    Physical Exam     ED Triage Vitals [01/19/24 1456]   BP (!) 136/92   Pulse 79   Resp 18   Temp 98.6 °F (37 °C)   Temp src Temporal   SpO2 98 %   O2 Device None (Room air)       Current:BP (!) 136/92   Pulse 79   Temp 98.6 °F (37 °C) (Temporal)   Resp 18   SpO2 98%         Physical Exam  Vitals and nursing note reviewed.   Constitutional:       General: He is not in acute distress.     Appearance: Normal appearance. He is not ill-appearing, toxic-appearing or diaphoretic.   HENT:      Head: Normocephalic.      Right Ear: Tympanic membrane, ear canal and external ear normal.      Left Ear: Tympanic membrane, ear canal and external ear normal.      Nose: Congestion and rhinorrhea present.      Mouth/Throat:      Mouth: Mucous membranes are moist.      Pharynx: Oropharynx is clear.   Eyes:      Conjunctiva/sclera: Conjunctivae normal.   Cardiovascular:      Rate and Rhythm: Normal rate and regular rhythm.      Pulses: Normal pulses.      Heart sounds: Normal heart sounds.   Pulmonary:      Effort: Pulmonary effort is normal. No tachypnea, bradypnea, accessory muscle usage, prolonged expiration, respiratory distress or retractions.      Breath sounds: Normal breath sounds and air entry. No stridor, decreased air movement or transmitted upper airway sounds. No decreased breath sounds, wheezing, rhonchi or rales.   Abdominal:      General: Abdomen is flat.   Musculoskeletal:         General: Normal range of motion.      Cervical back: Normal range of motion.   Skin:     General: Skin is warm.      Capillary Refill: Capillary refill takes less than 2 seconds.   Neurological:      General: No focal deficit present.      Mental Status: He is alert and oriented to person, place, and time.   Psychiatric:         Mood and Affect: Mood normal.         Behavior: Behavior normal.         Thought Content: Thought content normal.         Judgment: Judgment  normal.              ED Course     Labs Reviewed   RAPID SARS-COV-2 BY PCR - Abnormal; Notable for the following components:       Result Value    Rapid SARS-CoV-2 by PCR Detected (*)     All other components within normal limits          MDM             Medical Decision Making  Multiple medical diagnoses were considered including but not limited to viral versus bacterial etiology of upper respiratory complaints.  Patient is well appearing, non-toxic and in no acute distress.  Vital signs are stable.   COVID test was positive.  Recommended that patient quarantine for 5 days.  Patient is vaccinated against COVID.  Patient has no risk factors for severe disease including his age, history of pneumonia, obesity.  While patient has been having an ongoing cough for the past couple of months he did have an acute worsening of symptoms yesterday that precipitated him taking a COVID test.  Renal function testing was done in November 2023 that was normal.  I checked the patient's medication list and he does take colchicine daily which is contraindicated with Paxlovid.  I recommended the patient stop taking colchicine for the next 8 days.  Recommended the patient not start the Paxlovid until tomorrow as he took the colchicine this morning.  I sent a prescription for Paxlovid.  I also sent a prescription for Tessalon Perles for the cough with chest precautions.  Recommended that regardless of symptoms, the patient should wear a mask in public for 5 days.   Also advised to go to the emergency department if the patient develops any chest pain, shortness of breath, fever, vomiting, diarrhea or any other concerning complaints.  Discussed symptomatic management.  Recommended Flonase for nasal congestion, mucinex for chest congestion, tylenol or ibuprofen for fever or pain.  Recommended increasing PO fluid intake.   ED precautions discussed.  Patient advised to follow up with PCP in 2-3 days.  Patient agrees with this plan of care.   Patient verbalizes understanding of discharge instructions and plan of care.      Amount and/or Complexity of Data Reviewed  Labs: ordered. Decision-making details documented in ED Course.    Risk  OTC drugs.  Prescription drug management.        Disposition and Plan     Clinical Impression:  1. COVID-19    2. Cough         Disposition:  Discharge  1/19/2024  3:39 pm    Follow-up:  Roger Stubbs MD  [address]  [address]  [facility] [address]  [phone]                Medications Prescribed:  Discharge Medication List as of 1/19/2024  3:39 PM        START taking these medications    Details   nirmatrelvir-ritonavir 300-100 MG Oral Tablet Therapy Pack Take two nirmatrelvir tablets (300mg) with one ritonavir tablet (100mg) together twice daily for 5 days., Normal, Disp-30 tablet, R-0      benzonatate 100 MG Oral Cap Take 1 capsule (100 mg total) by mouth 3 (three) times daily as needed for cough., Normal, Disp-30 capsule, R-0

## 2024-01-19 NOTE — ED INITIAL ASSESSMENT (HPI)
Patient reports testing positive for covid today. Patient with chills, a cough, body aches, and fatigue. Patient states he received Paxlovid last time he had covid and would like it again.

## 2024-01-19 NOTE — PROCEDURES
[facility]     Pulmonary Function Test     Dusty Hills JrJaya Patient Status:  Outpatient    1945 MRN [id number]   Date of Exam 24 PCP EDWIGE VALENCIA MD           Spirometry   FEV1: 2.17 87%  FVC: 3.06 93%  FEV1/FVC: 0.71    Lung Volume   T.64 94%  RV : 2.40 103%    Diffusion Capacity   DLCO: 23.3 105%    Flow Volume Loop       Impression   Borderline mild obstructive defect seen without significant postbronchodilator response observed.  Normal lung volumes.  Normal diffusion capacity.    Sarabjit Lazo DO  Pulmonary Critical Care Medicine  [facility]

## 2024-01-19 NOTE — TELEPHONE ENCOUNTER
Yes for the hearing test.  The astelin and montetukast are for allergies.  So if he is having allergic symptoms then yes.

## 2024-01-19 NOTE — TELEPHONE ENCOUNTER
Per pt states he received call from Dr. Hernandez, states he was instructed to see pulmonologist. Pt asking if he should continue using azelastine and montelukast, also asking if he should have hearing test. Please advise thank you.

## 2024-01-19 NOTE — DISCHARGE INSTRUCTIONS
COVID test was positive.    I sent you a prescription for Paxlovid as discussed.  Please do not take your colchicine while you are taking the Paxlovid and for an additional 3 days.  Stop taking the colchicine for 8 days total.  I also sent you a prescription for Tessalon Perles for your cough, please take these as prescribed as needed.  These may make you drowsy so do not drive or drink alcohol when you take them.      You should quarantine for 5 days.   Regardless of your symptoms, you should wear a mask in public for 5 days.   Go to the emergency department if you develop any chest pain, shortness of breath, fever, vomiting, diarrhea or any other concerning complaints.  Use Flonase for nasal congestion, mucinex for chest congestion, tylenol or ibuprofen for fever or pain.  Increase PO fluid intake.  Follow up with PCP in 2-3 days.

## 2024-01-24 ENCOUNTER — PATIENT MESSAGE (OUTPATIENT)
Dept: OTOLARYNGOLOGY | Facility: CLINIC | Age: 79
End: 2024-01-24

## 2024-01-24 NOTE — TELEPHONE ENCOUNTER
Dr. Hernandez, please review.    What is the recommendation or any medication I can take to help my tinnitus number two do I keep taking the medication’s that was given to me for my pills and nose spray or do I stop taking those? Please let me know at your earliest convenience. Thank you Dusty Hills.     Message sent to patient.     Ammon Montano,    I hope all is well.    I have let Dr. Hernandez know about your situation, and see what she advises from there.    As for the two medications that you are asking about, continue to take these medications if you are exhibiting allergy symptoms.  The nasal spray and the allergy pill.    We will be in touch soon.    If you have any other questions or concerns, please send a [employer] message or call our office at [phone].    Kind Regards,  Melida DEL CASTILLO

## 2024-01-24 NOTE — TELEPHONE ENCOUNTER
The medications and nasal spray are for allergies.  You should decrease salt and caffeine for tinnitus.  You can also consider a vitamin lipoflavonoid which is 40% effective in reducing tinnitus.  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  Message sent to patient.

## 2024-01-31 ENCOUNTER — OFFICE VISIT (OUTPATIENT)
Dept: DERMATOLOGY CLINIC | Facility: CLINIC | Age: 79
End: 2024-01-31
Payer: COMMERCIAL

## 2024-01-31 DIAGNOSIS — L82.1 SK (SEBORRHEIC KERATOSIS): Primary | ICD-10-CM

## 2024-01-31 DIAGNOSIS — L21.9 SEBORRHEIC DERMATITIS, UNSPECIFIED: ICD-10-CM

## 2024-01-31 DIAGNOSIS — L81.4 LENTIGO: ICD-10-CM

## 2024-01-31 DIAGNOSIS — L30.8 PSORIASIFORM DERMATITIS: ICD-10-CM

## 2024-01-31 DIAGNOSIS — D22.9 MULTIPLE NEVI: ICD-10-CM

## 2024-01-31 DIAGNOSIS — D23.9 BENIGN NEOPLASM OF SKIN, UNSPECIFIED LOCATION: ICD-10-CM

## 2024-01-31 PROCEDURE — 99203 OFFICE O/P NEW LOW 30 MIN: CPT | Performed by: DERMATOLOGY

## 2024-01-31 RX ORDER — KETOCONAZOLE 20 MG/G
1 CREAM TOPICAL 2 TIMES DAILY
Qty: 30 G | Refills: 3 | Status: SHIPPED | OUTPATIENT
Start: 2024-01-31

## 2024-02-05 NOTE — PROGRESS NOTES
Dusty Hills Jr. is a 78 year old male.  HPI:     CC:    Chief Complaint   Patient presents with    Rash     LOV 7/18/16;  patient presents today with a rash/blotchiness on bilateral arms, for at least six months.  Started in summer, noted when he bumped against items, became red and itchy after hitting it.  He used vaseline on the arms     Upper Body Exam     He notes spots on chest and back; pt states no personal hx of skin cancer, sister with hx of skin cancer.  He notes the ones on back periodically bleed after scale falls off         Allergies:  Levaquin [levofloxacin], Penicillins, Amoxicillin, Ketorolac tromethamine, Latex, and Latex    HISTORY:    Past Medical History:   Diagnosis Date    Back problem     BPH (benign prostatic hyperplasia)     Calculus of kidney     Colon polyps     c-scopy     Dry eye     Ectopic kidney     left    Gout     Hematuria     sees Dr. Cabrera    Kidney stones     per [employer] (EMA): \"Righ pelvis w/ small kidney stones\"    Lumbar spinal stenosis 2012    s/p lumbar laminectomy L2-L5 - Dr Hernandez @ [facility]    Osteoarthritis     Pneumonia 6/29/15    Pneumonia due to organism     Rheumatoid arthritis (HCC) 2008    Rosacea     Rotator cuff tear, left 2008    Visual impairment     readers      Past Surgical History:   Procedure Laterality Date    BACK SURGERY      COLONOSCOPY      LAMINECTOMY,>2 SGMT,LUMBAR  2012    L2-L5 - Dr Hernandez @ [facility]    OTHER SURGICAL HISTORY  4-2015    right meniscus     OTHER SURGICAL HISTORY Bilateral     shoulder    SPINAL FUSION      CERVICAL    SPINE SURGERY PROCEDURE UNLISTED      lumbar and cervical fusion    TONSILLECTOMY        Family History   Problem Relation Age of Onset    Heart Disease Father 89    Breast Cancer Mother 60    Cancer Mother 87        lung    Breast Cancer Maternal Aunt     Breast Cancer Paternal Aunt     Breast Cancer Maternal Cousin Female     Breast Cancer Paternal Cousin Female     Breast Cancer Maternal Aunt      Breast Cancer Maternal Cousin Female       Social History     Socioeconomic History    Marital status:    Tobacco Use    Smoking status: Former     Packs/day: 1.00     Years: 30.00     Additional pack years: 0.00     Total pack years: 30.00     Types: Cigarettes     Quit date: 6/15/1989     Years since quittin.6    Smokeless tobacco: Never   Vaping Use    Vaping Use: Never used   Substance and Sexual Activity    Alcohol use: Not Currently     Alcohol/week: 2.5 standard drinks of alcohol     Types: 3 Glasses of wine per week    Drug use: Never   Other Topics Concern    Caffeine Concern Yes     Comment: Coffee 2 cups daily    Reaction to local anesthetic No    Pt has a pacemaker No    Pt has a defibrillator No   Social History Narrative    The patient does not use an assistive device..      The patient does live in a home with stairs.        Current Outpatient Medications   Medication Sig Dispense Refill    fluocinonide 0.05 % External Cream Use twice daily on affected areas on arms for itching and scaling 120 g 1    ketoconazole 2 % External Cream Apply 1 Application topically 2 (two) times daily. Apply to affected area 2 times daily.to scaly areas on face 30 g 3    benzonatate 100 MG Oral Cap Take 1 capsule (100 mg total) by mouth 3 (three) times daily as needed for cough. 30 capsule 0    azelastine 0.1 % Nasal Solution 2 sprays by Nasal route 2 (two) times daily. 30 mL 5    colchicine 0.6 MG Oral Tab Take 1 tablet (0.6 mg total) by mouth 2 (two) times daily. 180 tablet 3    Lifitegrast (XIIDRA) 5 % Ophthalmic Solution Place 1 drop into both eyes daily.      Vit C-Cholecalciferol-Marychuy Hip 500-1000-20 MG-UNIT-MG Oral Cap Take 500 mg by mouth daily.      Finasteride (PROSCAR OR) Take 5 mg by mouth every morning.      Vitamin D3 2000 UNITS Oral Cap Take 2 capsules (4,000 Units total) by mouth daily.      Vitamin B-1 100 MG Oral Tab Take 1 tablet (100 mg total) by mouth daily.      montelukast 10 MG Oral  Tab Take 1 tablet (10 mg total) by mouth nightly. (Patient not taking: Reported on 2024) 30 tablet 5    Meloxicam 7.5 MG Oral Tab Take 1 tablet (7.5 mg total) by mouth 2 (two) times daily as needed for Pain. (Patient not taking: Reported on 2024) 60 tablet 5     Allergies:   Allergies   Allergen Reactions    Levaquin [Levofloxacin] HIVES    Penicillins HIVES    Amoxicillin RASH    Ketorolac Tromethamine RASH    Latex ITCHING    Latex ITCHING       Past Medical History:   Diagnosis Date    Back problem     BPH (benign prostatic hyperplasia)     Calculus of kidney     Colon polyps     c-scopy     Dry eye     Ectopic kidney     left    Gout     Hematuria     sees Dr. Cabrera    Kidney stones     per [employer] (EMA): \"Righ pelvis w/ small kidney stones\"    Lumbar spinal stenosis     s/p lumbar laminectomy L2-L5 - Dr Hernandez @ [facility]    Osteoarthritis     Pneumonia 6/29/15    Pneumonia due to organism     Rheumatoid arthritis (HCC) 2008    Rosacea     Rotator cuff tear, left 2008    Visual impairment     readers     Past Surgical History:   Procedure Laterality Date    BACK SURGERY      COLONOSCOPY      LAMINECTOMY,>2 SGMT,LUMBAR      L2-L5 - Dr Hernandez @ [facility]    OTHER SURGICAL HISTORY      right meniscus     OTHER SURGICAL HISTORY Bilateral     shoulder    SPINAL FUSION      CERVICAL    SPINE SURGERY PROCEDURE UNLISTED      lumbar and cervical fusion    TONSILLECTOMY       Social History     Socioeconomic History    Marital status:      Spouse name: Not on file    Number of children: Not on file    Years of education: Not on file    Highest education level: Not on file   Occupational History    Not on file   Tobacco Use    Smoking status: Former     Packs/day: 1.00     Years: 30.00     Additional pack years: 0.00     Total pack years: 30.00     Types: Cigarettes     Quit date: 6/15/1989     Years since quittin.6    Smokeless tobacco: Never   Vaping Use    Vaping Use:  Never used   Substance and Sexual Activity    Alcohol use: Not Currently     Alcohol/week: 2.5 standard drinks of alcohol     Types: 3 Glasses of wine per week    Drug use: Never    Sexual activity: Not on file   Other Topics Concern     Service Not Asked    Blood Transfusions Not Asked    Caffeine Concern Yes     Comment: Coffee 2 cups daily    Occupational Exposure Not Asked    Hobby Hazards Not Asked    Sleep Concern Not Asked    Stress Concern Not Asked    Weight Concern Not Asked    Special Diet Not Asked    Back Care Not Asked    Exercise Not Asked    Bike Helmet Not Asked    Seat Belt Not Asked    Self-Exams Not Asked    Grew up on a farm Not Asked    History of tanning Not Asked    Outdoor occupation Not Asked    Reaction to local anesthetic No    Pt has a pacemaker No    Pt has a defibrillator No   Social History Narrative    The patient does not use an assistive device..      The patient does live in a home with stairs.     Social Determinants of Health     Financial Resource Strain: Not on file   Food Insecurity: Not on file   Transportation Needs: Not on file   Physical Activity: Not on file   Stress: Not on file   Social Connections: Not on file   Housing Stability: Not on file     Family History   Problem Relation Age of Onset    Heart Disease Father 89    Breast Cancer Mother 60    Cancer Mother 87        lung    Breast Cancer Maternal Aunt     Breast Cancer Paternal Aunt     Breast Cancer Maternal Cousin Female     Breast Cancer Paternal Cousin Female     Breast Cancer Maternal Aunt     Breast Cancer Maternal Cousin Female        There were no vitals filed for this visit.    HPI:    Chief Complaint   Patient presents with    Rash     LOV 7/18/16;  patient presents today with a rash/blotchiness on bilateral arms, for at least six months.  Started in summer, noted when he bumped against items, became red and itchy after hitting it.  He used vaseline on the arms     Upper Body Exam     He notes  spots on chest and back; pt states no personal hx of skin cancer, sister with hx of skin cancer.  He notes the ones on back periodically bleed after scale falls off     Follow-up family history of skin cancer in his sister, no personal history of skin cancer Notes multiple skin lesions.  Notes.  Rash lesions over the arms splotchy red currently using Vaseline asymptomatic  Patient presents with concerns above.    Patient has been in their usual state of health.  History, medications, allergies reviewed as noted.      ROS:  Denies any other systemic complaints.  No new or changeing lesions other than noted above. No fevers, chills, night sweats, unusual sun sensitivity.  No other skin complaints.        History, medications, allergies reviewed as noted.       Physical Examination:     Well-developed well-nourished patient alert oriented in no acute distress.  Exam total-body performed, including scalp, head, neck, face,nails, hair, external eyes, including conjunctival mucosa, eyelids, lips external ears, back, chest,/ breasts, axillae,  abdomen, arms, legs, palms.     Multiple light to medium brown, well marginated, uniformly pigmented, macules and papules 6 mm and less scattered on exam. pigmented lesions examined with dermoscopy benign-appearing patterns.     Waxy tannish keratotic papules scattered, cherry-red vascular papules scattered.    See map today's date for lesions noted .    Erythema scaling over the brows temples nasolabial folds nasal sidewall  Otherwise remarkable for lesions as noted on map.  See Assessment /Plan for additional history and physical exam also:    Assessment / plan:    No orders of the defined types were placed in this encounter.      Meds & Refills for this Visit:  Requested Prescriptions     Signed Prescriptions Disp Refills    fluocinonide 0.05 % External Cream 120 g 1     Sig: Use twice daily on affected areas on arms for itching and scaling    ketoconazole 2 % External Cream 30 g  3     Sig: Apply 1 Application topically 2 (two) times daily. Apply to affected area 2 times daily.to scaly areas on face         Encounter Diagnoses   Name Primary?    SK (seborrheic keratosis) Yes    Benign neoplasm of skin, unspecified location     Lentigo     Multiple nevi     Seborrheic dermatitis, unspecified     Psoriasiform dermatitis        Arms with psoriasiform patches  Warm dermatitis meds in grid.  Skin care instructions reviewed.  [place] use of emollients.  Pathophysiology reviewed.  Consider Contac allergy in differential.  Consider patch testing.  Patient will let us know how they are doing over the next several weeks.  Await clinical response to above therapies.  Stress  Large nevi left upper back posterior shoulder.  Multiple keratoses over mid back.  Benign keratosis at left brow reassurance.    Erythematous scaling patches over the cheeks brows and chin  Seborrheic dermatitis, rosacea overlap.  Pathophysiology discussed with patient.  Therapeutic options reviewed.  See  Medications in grid.  Instructions reviewed at length..Rosacea.  Meds in grid.  Skin care instructions reviewed.  Pathophysiology reviewed.  Chronic recurrent nature discussed.  Patient will let us know how they are doing over the next several weeks.  Await clinical response to above therapy.  Ketoconazole cream to face  Consider over-the-counter hydrocortisone if this worsens    Extensive benign keratoses back chest face      No other suspicious lesions observe larger nevi  Please refer to map for specific lesions.  See additional diagnoses.  Pros cons of various therapies, risks benefits discussed.Pathophysiology discussed with patient.  Therapeutic options reviewed.  See  Medications in grid.  Instructions reviewed at length.    Benign nevi, seborrheic  keratoses, cherry angiomas:  Reassurance regarding other benign skin lesions.Signs and symptoms of skin cancer, ABCDE's of melanoma discussed with patient. Sunscreen use, sun  protection, self exams reviewed.  Followup as noted [facility] routine checkup 6 mos - one year or p.r.n.  The patient indicates understanding of these issues and agrees to the plan.  The patient is asked to return as noted in follow-up/ above.    This note was generated using Dragon voice recognition software.  Please contact me regarding any confusion resulting from errors in recognition.  Encounter Times   Including precharting, reviewing chart, prior notes obtaining history: 10 minutes, medical exam :10 minutes, notes on body map, plan, counseling 10minutes My total time spent caring for the patient on the day of the encounter: 30 minutes    Note to patient and family: The 21st Century Cures Act makes medical notes like these available to patients. However, be advised this is a medical document. It is intended as [id number] communication and monitoring of a patient's care needs. It is written in medical language and may contain abbreviations or verbiage that are unfamiliar. It may appear blunt or direct. Medical documents are intended to carry relevant information, facts as evident and the clinical opinion of the practitioner.

## 2024-02-14 DIAGNOSIS — R05.2 SUBACUTE COUGH: ICD-10-CM

## 2024-02-14 DIAGNOSIS — Z87.891 FORMER SMOKER: Primary | ICD-10-CM

## 2024-02-20 ENCOUNTER — HOSPITAL ENCOUNTER (OUTPATIENT)
Dept: CT IMAGING | Facility: HOSPITAL | Age: 79
Discharge: HOME | End: 2024-02-20
Attending: STUDENT IN AN ORGANIZED HEALTH CARE EDUCATION/TRAINING PROGRAM
Payer: COMMERCIAL

## 2024-02-20 DIAGNOSIS — Z87.891 FORMER SMOKER: ICD-10-CM

## 2024-02-20 DIAGNOSIS — R05.2 SUBACUTE COUGH: ICD-10-CM

## 2024-02-20 PROCEDURE — 71250 CT THORAX DX C-: CPT | Performed by: STUDENT IN AN ORGANIZED HEALTH CARE EDUCATION/TRAINING PROGRAM

## 2024-05-09 RX ORDER — MONTELUKAST SODIUM 10 MG/1
10 TABLET ORAL NIGHTLY
Qty: 90 TABLET | Refills: 0 | Status: SHIPPED | OUTPATIENT
Start: 2024-05-09

## 2024-05-09 RX ORDER — KETOCONAZOLE 20 MG/G
CREAM TOPICAL
Qty: 180 G | Refills: 1 | Status: SHIPPED | OUTPATIENT
Start: 2024-05-09

## 2024-05-09 NOTE — TELEPHONE ENCOUNTER
Refill Request for medication(s):     Last Office Visit: 1/31/2024    Last Refill: 1/31/24    Pharmacy, Dosage verified: yes

## 2024-06-05 ENCOUNTER — LAB ENCOUNTER (OUTPATIENT)
Dept: LAB | Age: 79
End: 2024-06-05
Attending: UROLOGY
Payer: COMMERCIAL

## 2024-06-05 DIAGNOSIS — N13.9 URINARY OBSTRUCTION: ICD-10-CM

## 2024-06-05 DIAGNOSIS — Z12.5 SPECIAL SCREENING, PROSTATE CANCER: ICD-10-CM

## 2024-06-05 DIAGNOSIS — Z00.01 ENCOUNTER FOR GENERAL ADULT MEDICAL EXAMINATION WITH ABNORMAL FINDINGS: Primary | ICD-10-CM

## 2024-06-05 LAB
ALBUMIN SERPL-MCNC: 4.3 G/DL (ref 3.2–4.8)
ALBUMIN/GLOB SERPL: 1.7 {RATIO} (ref 1–2)
ALP LIVER SERPL-CCNC: 85 U/L
ALT SERPL-CCNC: 35 U/L
ANION GAP SERPL CALC-SCNC: 6 MMOL/L (ref 0–18)
AST SERPL-CCNC: 41 U/L (ref ?–34)
BASOPHILS # BLD AUTO: 0.06 X10(3) UL (ref 0–0.2)
BASOPHILS NFR BLD AUTO: 1 %
BILIRUB SERPL-MCNC: 0.7 MG/DL (ref 0.2–1.1)
BILIRUB UR QL: NEGATIVE
BUN BLD-MCNC: 16 MG/DL (ref 9–23)
BUN/CREAT SERPL: 16.3 (ref 10–20)
CALCIUM BLD-MCNC: 9.8 MG/DL (ref 8.7–10.4)
CHLORIDE SERPL-SCNC: 107 MMOL/L (ref 98–112)
CHOLEST SERPL-MCNC: 172 MG/DL (ref ?–200)
CLARITY UR: CLEAR
CO2 SERPL-SCNC: 28 MMOL/L (ref 21–32)
COMPLEXED PSA SERPL-MCNC: 0.98 NG/ML (ref ?–4)
CREAT BLD-MCNC: 0.98 MG/DL
DEPRECATED RDW RBC AUTO: 41.3 FL (ref 35.1–46.3)
EGFRCR SERPLBLD CKD-EPI 2021: 79 ML/MIN/1.73M2 (ref 60–?)
EOSINOPHIL # BLD AUTO: 0.21 X10(3) UL (ref 0–0.7)
EOSINOPHIL NFR BLD AUTO: 3.6 %
ERYTHROCYTE [DISTWIDTH] IN BLOOD BY AUTOMATED COUNT: 12.8 % (ref 11–15)
EST. AVERAGE GLUCOSE BLD GHB EST-MCNC: 117 MG/DL (ref 68–126)
FASTING PATIENT LIPID ANSWER: YES
FASTING STATUS PATIENT QL REPORTED: YES
GLOBULIN PLAS-MCNC: 2.6 G/DL (ref 2–3.5)
GLUCOSE BLD-MCNC: 85 MG/DL (ref 70–99)
GLUCOSE UR-MCNC: NORMAL MG/DL
HBA1C MFR BLD: 5.7 % (ref ?–5.7)
HCT VFR BLD AUTO: 46.2 %
HDLC SERPL-MCNC: 48 MG/DL (ref 40–59)
HGB BLD-MCNC: 15.5 G/DL
HGB UR QL STRIP.AUTO: NEGATIVE
IMM GRANULOCYTES # BLD AUTO: 0.01 X10(3) UL (ref 0–1)
IMM GRANULOCYTES NFR BLD: 0.2 %
KETONES UR-MCNC: NEGATIVE MG/DL
LDLC SERPL CALC-MCNC: 108 MG/DL (ref ?–100)
LEUKOCYTE ESTERASE UR QL STRIP.AUTO: NEGATIVE
LYMPHOCYTES # BLD AUTO: 1.83 X10(3) UL (ref 1–4)
LYMPHOCYTES NFR BLD AUTO: 31.2 %
MCH RBC QN AUTO: 29.8 PG (ref 26–34)
MCHC RBC AUTO-ENTMCNC: 33.5 G/DL (ref 31–37)
MCV RBC AUTO: 88.8 FL
MONOCYTES # BLD AUTO: 0.52 X10(3) UL (ref 0.1–1)
MONOCYTES NFR BLD AUTO: 8.9 %
NEUTROPHILS # BLD AUTO: 3.24 X10 (3) UL (ref 1.5–7.7)
NEUTROPHILS # BLD AUTO: 3.24 X10(3) UL (ref 1.5–7.7)
NEUTROPHILS NFR BLD AUTO: 55.1 %
NITRITE UR QL STRIP.AUTO: NEGATIVE
NONHDLC SERPL-MCNC: 124 MG/DL (ref ?–130)
OSMOLALITY SERPL CALC.SUM OF ELEC: 292 MOSM/KG (ref 275–295)
PH UR: 5 [PH] (ref 5–8)
PLATELET # BLD AUTO: 197 10(3)UL (ref 150–450)
POTASSIUM SERPL-SCNC: 4.6 MMOL/L (ref 3.5–5.1)
PROT SERPL-MCNC: 6.9 G/DL (ref 5.7–8.2)
PROT UR-MCNC: NEGATIVE MG/DL
PSA SERPL-MCNC: 0.98 NG/ML (ref ?–4)
RBC # BLD AUTO: 5.2 X10(6)UL
SODIUM SERPL-SCNC: 141 MMOL/L (ref 136–145)
SP GR UR STRIP: 1.01 (ref 1–1.03)
TRIGL SERPL-MCNC: 83 MG/DL (ref 30–149)
TSI SER-ACNC: 1.52 MIU/ML (ref 0.55–4.78)
UROBILINOGEN UR STRIP-ACNC: NORMAL
VLDLC SERPL CALC-MCNC: 14 MG/DL (ref 0–30)
WBC # BLD AUTO: 5.9 X10(3) UL (ref 4–11)

## 2024-06-05 PROCEDURE — 83036 HEMOGLOBIN GLYCOSYLATED A1C: CPT

## 2024-06-05 PROCEDURE — 36415 COLL VENOUS BLD VENIPUNCTURE: CPT

## 2024-06-05 PROCEDURE — 84153 ASSAY OF PSA TOTAL: CPT

## 2024-06-05 PROCEDURE — 81003 URINALYSIS AUTO W/O SCOPE: CPT

## 2024-06-05 PROCEDURE — 84443 ASSAY THYROID STIM HORMONE: CPT

## 2024-06-05 PROCEDURE — 85025 COMPLETE CBC W/AUTO DIFF WBC: CPT

## 2024-06-05 PROCEDURE — 80053 COMPREHEN METABOLIC PANEL: CPT

## 2024-06-05 PROCEDURE — 80061 LIPID PANEL: CPT

## 2024-06-27 ENCOUNTER — HOSPITAL ENCOUNTER (OUTPATIENT)
Dept: CT IMAGING | Age: 79
Discharge: HOME | End: 2024-06-27
Attending: INTERNAL MEDICINE

## 2024-06-27 DIAGNOSIS — Z13.6 SCREENING FOR CARDIOVASCULAR CONDITION: ICD-10-CM

## 2024-07-16 ENCOUNTER — OFFICE VISIT (OUTPATIENT)
Dept: RHEUMATOLOGY | Facility: CLINIC | Age: 79
End: 2024-07-16
Payer: COMMERCIAL

## 2024-07-16 VITALS
HEART RATE: 54 BPM | WEIGHT: 245 LBS | SYSTOLIC BLOOD PRESSURE: 132 MMHG | OXYGEN SATURATION: 94 % | TEMPERATURE: 98 F | DIASTOLIC BLOOD PRESSURE: 66 MMHG | BODY MASS INDEX: 41 KG/M2 | RESPIRATION RATE: 17 BRPM

## 2024-07-16 DIAGNOSIS — M11.262 CHONDROCALCINOSIS OF BOTH KNEES: Primary | ICD-10-CM

## 2024-07-16 DIAGNOSIS — M18.0 OSTEOARTHRITIS OF CARPOMETACARPAL (CMC) JOINT OF BOTH THUMBS: ICD-10-CM

## 2024-07-16 DIAGNOSIS — M11.261 CHONDROCALCINOSIS OF BOTH KNEES: Primary | ICD-10-CM

## 2024-07-16 DIAGNOSIS — M19.041 OSTEOARTHRITIS OF FINGERS OF BOTH HANDS: ICD-10-CM

## 2024-07-16 DIAGNOSIS — Z87.39 H/O CALCIUM PYROPHOSPHATE DEPOSITION DISEASE (CPPD): ICD-10-CM

## 2024-07-16 DIAGNOSIS — Z98.1 S/P CERVICAL SPINAL FUSION: ICD-10-CM

## 2024-07-16 DIAGNOSIS — M19.042 OSTEOARTHRITIS OF FINGERS OF BOTH HANDS: ICD-10-CM

## 2024-07-16 PROCEDURE — 3078F DIAST BP <80 MM HG: CPT | Performed by: INTERNAL MEDICINE

## 2024-07-16 PROCEDURE — 99214 OFFICE O/P EST MOD 30 MIN: CPT | Performed by: INTERNAL MEDICINE

## 2024-07-16 PROCEDURE — 3075F SYST BP GE 130 - 139MM HG: CPT | Performed by: INTERNAL MEDICINE

## 2024-07-16 RX ORDER — COLLAGENASE CLOSTRIDIUM HIST.
POWDER (EA) MISCELLANEOUS
COMMUNITY

## 2024-07-16 RX ORDER — MELOXICAM 7.5 MG/1
7.5 TABLET ORAL DAILY
Qty: 60 TABLET | Refills: 5 | Status: CANCELLED
Start: 2024-07-16

## 2024-07-16 RX ORDER — COLCHICINE 0.6 MG/1
0.6 TABLET ORAL 2 TIMES DAILY
Qty: 180 TABLET | Refills: 3 | Status: SHIPPED | OUTPATIENT
Start: 2024-07-16

## 2024-07-16 NOTE — PROGRESS NOTES
[facility]  Dr. Guzman Progress Note     Subjective:   Dusty Hills Jr. is a(n) 78 year old male.   Current complaints:   Chief Complaint   Patient presents with    Follow - Up     Yearly f/u. Pt states symptoms have remained the same since LOV. Pt has tried to decrease colchicine from twice daily to once daily for 5 months but started to experience BL wrist pain. Has since went back to regular dosage.   Yearly visit.  History of pseudogout and chondrocalcinosis of the knees.  Taking colchicine.  Also history of spinal stenosis lumbar spine, osteoarthritis of the hands, cervical spine fusion, laminectomy of lumbar spine.  His last cervical fusion was 3/14/23 levels C3-C5 done at [facility] in [place].  Feeling ok.  On colchicine twice a day,  Has some discomfort in the left arm.   Had EMG inconclusive.  Still able to play golf.  Hands stiff in the morning.  Also on flomax.    Of meloxicam, using arthritis strength tylenol.    Objective:   /66   Pulse 54   Temp 98.1 °F (36.7 °C)   Resp 17   Wt 245 lb (111.1 kg)   SpO2 94%   BMI 40.77 kg/m²   Lungs clear  Heart nsr  Abd  obese   Knees non tender  Hands  - mild spurs cmc joints   Pips trace spurred tender.    6/5/2024 glucose 85 hemoglobin A1c 5.7 sodium 141 potassium 4.6 chloride 107 BUN 16 creatinine 0.98 calcium 9.8 GFR 79 alkaline phosphatase 85 AST 41 ALT 35 bilirubin 0.7 globulin 2.6 total protein 6.9 albumin 4.3 cholesterol 172 triglyceride 83 HDL 48  TSH 1.52 white count 5.9 hemoglobin 15.5 hematocrit 46.2 platelet count 197,000.  PSA 0.98 urinalysis unremarkable.  Assessment:     Encounter Diagnoses   Name Primary?    Chondrocalcinosis of both knees Yes    Osteoarthritis of carpometacarpal (CMC) joint of both thumbs     Osteoarthritis of fingers of both hands     S/P cervical spinal fusion     H/O calcium pyrophosphate deposition disease (CPPD)      Plan:     Patient Instructions   Continue colchicine 0.6 mg twice a day.  Continue  to use arthritis strength Tylenol as needed.  Main side effect of colchicine is diarrhea or stomach upset which you are not experiencing.  Continue to follow low-fat diet try to prop some weight.  Exercise by walking as best you can.  Return to office for recheck 1 year.        Jani Guzman MD 7/16/2024 10:07 AM

## 2024-07-16 NOTE — PATIENT INSTRUCTIONS
Continue colchicine 0.6 mg twice a day.  Continue to use arthritis strength Tylenol as needed.  Main side effect of colchicine is diarrhea or stomach upset which you are not experiencing.  Continue to follow low-fat diet try to prop some weight.  Exercise by walking as best you can.  Return to office for recheck 1 year.

## 2024-08-19 RX ORDER — MONTELUKAST SODIUM 10 MG/1
10 TABLET ORAL NIGHTLY
Qty: 90 TABLET | Refills: 0 | Status: SHIPPED | OUTPATIENT
Start: 2024-08-19

## 2024-11-30 ENCOUNTER — OFFICE VISIT (OUTPATIENT)
Dept: DERMATOLOGY CLINIC | Facility: CLINIC | Age: 79
End: 2024-11-30
Payer: COMMERCIAL

## 2024-11-30 DIAGNOSIS — L21.9 SEBORRHEIC DERMATITIS, UNSPECIFIED: ICD-10-CM

## 2024-11-30 DIAGNOSIS — L40.0 PSORIASIS VULGARIS: ICD-10-CM

## 2024-11-30 DIAGNOSIS — D22.9 MULTIPLE NEVI: ICD-10-CM

## 2024-11-30 DIAGNOSIS — L82.1 SK (SEBORRHEIC KERATOSIS): ICD-10-CM

## 2024-11-30 DIAGNOSIS — D48.5 NEOPLASM OF UNCERTAIN BEHAVIOR OF SKIN: Primary | ICD-10-CM

## 2024-11-30 DIAGNOSIS — L81.4 LENTIGO: ICD-10-CM

## 2024-11-30 DIAGNOSIS — L21.8 SEBORRHEA-LIKE DERMATITIS WITH PSORIASIFORM ELEMENTS: ICD-10-CM

## 2024-11-30 DIAGNOSIS — D23.9 BENIGN NEOPLASM OF SKIN, UNSPECIFIED LOCATION: ICD-10-CM

## 2024-11-30 PROCEDURE — 88305 TISSUE EXAM BY PATHOLOGIST: CPT | Performed by: DERMATOLOGY

## 2024-11-30 PROCEDURE — 99214 OFFICE O/P EST MOD 30 MIN: CPT | Performed by: DERMATOLOGY

## 2024-11-30 PROCEDURE — 11102 TANGNTL BX SKIN SINGLE LES: CPT | Performed by: DERMATOLOGY

## 2024-11-30 RX ORDER — KETOCONAZOLE 20 MG/G
CREAM TOPICAL
Qty: 180 G | Refills: 1 | Status: SHIPPED | OUTPATIENT
Start: 2024-11-30

## 2024-11-30 RX ORDER — KETOCONAZOLE 20 MG/ML
SHAMPOO, SUSPENSION TOPICAL
Qty: 360 ML | Refills: 2 | Status: SHIPPED | OUTPATIENT
Start: 2024-11-30

## 2024-11-30 RX ORDER — CALCIPOTRIENE, BETAMETHASONE DIPROPIONATE 50; .643 UG/G; MG/G
1 OINTMENT TOPICAL DAILY
Qty: 100 G | Refills: 3 | Status: SHIPPED | OUTPATIENT
Start: 2024-11-30

## 2024-12-01 NOTE — PROGRESS NOTES
Operative Report                     Shave/  Tangential biopsy     Clinical diagnosis:    Size of lesion:    Location:Patient with tender keratotic papule 1cm left medial chest r/o SCC      Procedure:    With patient in appropriate position the skin of the above was scrubbed with alcohol.  Anesthesia was obtained with 1% Xylocaine with epinephrine.  The skin surrounding the lesion was placed under tension and the lesion was incised using a #15 scalpel blade.  The specimen was sent for histopathologic exam.    Hemostasis was obtained with electrocautery/aluminum chloride.  Estimated blood loss less than 2 cc.    Biopsy dressed with Polysporin, bandage.    Pressure dressing:   No    Complications: None    Written instructions given and reviewed with patient    Await pathology    Contact information reviewed.    Procedural physician:  Catherine Barrera MD

## 2024-12-01 NOTE — PROGRESS NOTES
Dusty Hills Jr. is a 79 year old male.  HPI:     CC:    Chief Complaint   Patient presents with    Derm Problem     LOV 01/31/2024 Pt present for dry patchy spots on both arms. Pt reports that they are itchy and said they began 6 month ago. Pt is treating it with a variety of creams and lotions. Pt has a hx of Sks         Allergies:  Levaquin [levofloxacin], Penicillins, Amoxicillin, Ketorolac tromethamine, Latex, and Latex    HISTORY:    Past Medical History:    Back problem    BPH (benign prostatic hyperplasia)    Calculus of kidney    Colon polyps    c-scopy     Dry eye    Ectopic kidney    left    Gout    Hematuria    sees Dr. Cabrera    Kidney stones    per [employer] (EMA): \"Righ pelvis w/ small kidney stones\"    Lumbar spinal stenosis    s/p lumbar laminectomy L2-L5 - Dr Hernandez @ [place]    Osteoarthritis    Pneumonia    Pneumonia due to organism    Rheumatoid arthritis (HCC)    Rosacea    Rotator cuff tear, left    Visual impairment    readers      Past Surgical History:   Procedure Laterality Date    Back surgery      Colonoscopy      Laminectomy,>2 sgmt,lumbar  2012    L2-L5 - Dr Hernandez @ [place]    Other surgical history  4-2015    right meniscus     Other surgical history Bilateral     shoulder    Spinal fusion      CERVICAL    Spine surgery procedure unlisted      lumbar and cervical fusion    Tonsillectomy        Family History   Problem Relation Age of Onset    Heart Disease Father 89    Breast Cancer Mother 60    Cancer Mother 87        lung    Breast Cancer Maternal Aunt     Breast Cancer Paternal Aunt     Breast Cancer Maternal Cousin Female     Breast Cancer Paternal Cousin Female     Breast Cancer Maternal Aunt     Breast Cancer Maternal Cousin Female       Social History     Socioeconomic History    Marital status:    Tobacco Use    Smoking status: Former     Current packs/day: 0.00     Average packs/day: 1 pack/day for 30.0 years (30.0 ttl pk-yrs)     Types: Cigarettes      Start date: 6/15/1959     Quit date: 6/15/1989     Years since quittin.4    Smokeless tobacco: Never   Vaping Use    Vaping status: Never Used   Substance and Sexual Activity    Alcohol use: Not Currently     Alcohol/week: 2.5 standard drinks of alcohol     Types: 3 Glasses of wine per week    Drug use: Never   Other Topics Concern    Caffeine Concern Yes     Comment: Coffee 2 cups daily    Reaction to local anesthetic No    Pt has a pacemaker No    Pt has a defibrillator No   Social History Narrative    The patient does not use an assistive device..      The patient does live in a home with stairs.     Social Drivers of Health      Received from [facility], [facility]    Social Connections    Received from [facility], [facility]        Current Outpatient Medications   Medication Sig Dispense Refill    ketoconazole 2 % External Cream APPLY TO AFFECTED AREA 2 TIMES DAILY TO SCALY AREA ON FACE 180 g 1    Calcipotriene-Betameth Diprop 0.005-0.064 % External Ointment Apply 1 Application topically daily. To psoriasis on arms, neck, trunk 100 g 3    ketoconazole 2 % External Shampoo Apply to scalp 2 times per week. 360 mL 2    MONTELUKAST 10 MG Oral Tab TAKE 1 TABLET(10 MG) BY MOUTH EVERY NIGHT 90 tablet 0    colchicine 0.6 MG Oral Tab Take 1 tablet (0.6 mg total) by mouth 2 (two) times daily. 180 tablet 3    Collagenase Does not apply Powder       fluocinonide 0.05 % External Cream Use twice daily on affected areas on arms for itching and scaling 240 g 0    azelastine 0.1 % Nasal Solution 2 sprays by Nasal route 2 (two) times daily. 30 mL 5    colchicine 0.6 MG Oral Tab Take 1 tablet (0.6 mg total) by mouth 2 (two) times daily. 180 tablet 3    Meloxicam 7.5 MG Oral Tab Take 1 tablet (7.5 mg total) by mouth 2 (two) times daily as needed for Pain. (Patient taking differently: Take 1 tablet (7.5 mg total) by mouth as needed for Pain.) 60 tablet 5    Lifitegrast  (XIIDRA) 5 % Ophthalmic Solution Place 1 drop into both eyes daily.      Vit C-Cholecalciferol-Marychuy Hip 500-1000-20 MG-UNIT-MG Oral Cap Take 500 mg by mouth daily.      Finasteride (PROSCAR OR) Take 5 mg by mouth every morning.      Vitamin D3 2000 UNITS Oral Cap Take 1 capsule (2,000 Units total) by mouth daily.       Allergies:   Allergies   Allergen Reactions    Levaquin [Levofloxacin] HIVES    Penicillins HIVES    Amoxicillin RASH    Ketorolac Tromethamine RASH    Latex ITCHING    Latex ITCHING       Past Medical History:    Back problem    BPH (benign prostatic hyperplasia)    Calculus of kidney    Colon polyps    c-scopy     Dry eye    Ectopic kidney    left    Gout    Hematuria    sees Dr. Cabrera    Kidney stones    per [employer] (EMA): \"Righ pelvis w/ small kidney stones\"    Lumbar spinal stenosis    s/p lumbar laminectomy L2-L5 - Dr Hernandez @ [place]    Osteoarthritis    Pneumonia    Pneumonia due to organism    Rheumatoid arthritis (HCC)    Rosacea    Rotator cuff tear, left    Visual impairment    readers     Past Surgical History:   Procedure Laterality Date    Back surgery      Colonoscopy      Laminectomy,>2 sgmt,lumbar  2012    L2-L5 - Dr Hernandez @ [place]    Other surgical history  -    right meniscus     Other surgical history Bilateral     shoulder    Spinal fusion      CERVICAL    Spine surgery procedure unlisted      lumbar and cervical fusion    Tonsillectomy       Social History     Socioeconomic History    Marital status:      Spouse name: Not on file    Number of children: Not on file    Years of education: Not on file    Highest education level: Not on file   Occupational History    Not on file   Tobacco Use    Smoking status: Former     Current packs/day: 0.00     Average packs/day: 1 pack/day for 30.0 years (30.0 ttl pk-yrs)     Types: Cigarettes     Start date: 6/15/1959     Quit date: 6/15/1989     Years since quittin.4    Smokeless tobacco: Never   Vaping Use     Vaping status: Never Used   Substance and Sexual Activity    Alcohol use: Not Currently     Alcohol/week: 2.5 standard drinks of alcohol     Types: 3 Glasses of wine per week    Drug use: Never    Sexual activity: Not on file   Other Topics Concern     Service Not Asked    Blood Transfusions Not Asked    Caffeine Concern Yes     Comment: Coffee 2 cups daily    Occupational Exposure Not Asked    Hobby Hazards Not Asked    Sleep Concern Not Asked    Stress Concern Not Asked    Weight Concern Not Asked    Special Diet Not Asked    Back Care Not Asked    Exercise Not Asked    Bike Helmet Not Asked    Seat Belt Not Asked    Self-Exams Not Asked    Grew up on a farm Not Asked    History of tanning Not Asked    Outdoor occupation Not Asked    Reaction to local anesthetic No    Pt has a pacemaker No    Pt has a defibrillator No   Social History Narrative    The patient does not use an assistive device..      The patient does live in a home with stairs.     Social Drivers of Health     Financial Resource Strain: Not on file   Food Insecurity: Not on file   Transportation Needs: Not on file   Physical Activity: Not on file   Stress: Not on file   Social Connections: Unknown (3/11/2021)    [facility], [facility]    Social Connections     Conversations with friends/family/neighbors per week: Not on file   Housing Stability: At Risk (10/12/2023)    [facility] [employer]     Living Situation: Not on file     Housing Problems: Not on file     Family History   Problem Relation Age of Onset    Heart Disease Father 89    Breast Cancer Mother 60    Cancer Mother 87        lung    Breast Cancer Maternal Aunt     Breast Cancer Paternal Aunt     Breast Cancer Maternal Cousin Female     Breast Cancer Paternal Cousin Female     Breast Cancer Maternal Aunt     Breast Cancer Maternal Cousin Female        There were no vitals filed for this  visit.    HPI:    Chief Complaint   Patient presents with    Derm Problem     LOV 01/31/2024 Pt present for dry patchy spots on both arms. Pt reports that they are itchy and said they began 6 month ago. Pt is treating it with a variety of creams and lotions. Pt has a hx of Sks     Follow-up family history of skin cancer in his sister, no personal history of skin cancer (Stephanie) worsening lesions on the arms.  Use fluocinonide without much improvement.  Frustrated.  Does report family history of psoriasis and his son severe psoriasis, and additional family members including.  Skin  Patient presents with concerns above.    Patient has been in their usual state of health.  History, medications, allergies reviewed as noted.      ROS:  Denies any other systemic complaints.  No new or changeing lesions other than noted above. No fevers, chills, night sweats, unusual sun sensitivity.  No other skin complaints.        History, medications, allergies reviewed as noted.       Physical Examination:     Well-developed well-nourished patient alert oriented in no acute distress.  Exam total-body performed, including scalp, head, neck, face,nails, hair, external eyes, including conjunctival mucosa, eyelids, lips external ears, back, chest,/ breasts, axillae,  abdomen, arms, legs, palms.     Multiple light to medium brown, well marginated, uniformly pigmented, macules and papules 6 mm and less scattered on exam. pigmented lesions examined with dermoscopy benign-appearing patterns.     Waxy tannish keratotic papules scattered, cherry-red vascular papules scattered.    See map today's date for lesions noted .    Erythema scaling over the brows temples nasolabial folds nasal sidewall  Otherwise remarkable for lesions as noted on map.  See Assessment /Plan for additional history and physical exam also:    Assessment / plan:    Orders Placed This Encounter   Procedures    Specimen to Pathology, Tissue [[facility] Pt to [facility] lab]       Meds  & Refills for this Visit:  Requested Prescriptions     Signed Prescriptions Disp Refills    ketoconazole 2 % External Cream 180 g 1     Sig: APPLY TO AFFECTED AREA 2 TIMES DAILY TO SCALY AREA ON FACE    Calcipotriene-Betameth Diprop 0.005-0.064 % External Ointment 100 g 3     Sig: Apply 1 Application topically daily. To psoriasis on arms, neck, trunk    ketoconazole 2 % External Shampoo 360 mL 2     Sig: Apply to scalp 2 times per week.         Encounter Diagnoses   Name Primary?    Neoplasm of uncertain behavior of skin Yes    SK (seborrheic keratosis)     Benign neoplasm of skin, unspecified location     Lentigo     Multiple nevi     Seborrheic dermatitis, unspecified     Psoriasis vulgaris     Seborrhea-like dermatitis with psoriasiform elements      Patient with tender keratotic papule 1cm left medial chest r/o SCC  Shave/ tangential biopsy performed, operative note and consent in chart further plans pending pathology      Worsening psoriasis plaques over the elbows forearms bilateral, scattered patches over the back, abdomen, persistent psoriasiform scale over the face, minimal occipital scalp.    Psoriasis.  Plaque-type.  10% body surface involvement.  Will treat with medications and grid.  Overall skincare, liberal use of emollients discussed.  Consider more aggressive therapy if worsening.Patient will let us know how they are doing over the next several weeks.  Await clinical response to above therapy.  Recheck in 2-3 months if no improvement.  Taclonex ointment daily.  Consider alternatives if not covered.  Patient will be in [place] over the winter recheck on following up in the spring consider systemic treatments at this point in time we will proceed with topicals.  Given family history more severe psoriasis consider additional options.  Labs reviewed mild elevation AST ALT normal creatinine normal A1c 5.7 thyroid normal CBC normal    Patient has gained weight recently is working on weight loss.  This may  help as well.  Sun exposure, warmer weather may be helpful.      Erythematous scaling patches over the cheeks brows and chin  Seborrheic dermatitis, rosacea overlap.  Pathophysiology discussed with patient.  Therapeutic options reviewed.  See  Medications in grid.  Instructions reviewed at length..Rosacea.  Meds in grid.  Skin care instructions reviewed.  Pathophysiology reviewed.  Chronic recurrent nature discussed.  Patient will let us know how they are doing over the next several weeks.  Await clinical response to above therapy.  Ketoconazole cream to face  Consider additional topicals if worsening  Ketoconazole has been helpful    Large nevi left upper back posterior shoulder.  Multiple keratoses over mid back.  Benign keratosis at left brow reassurance.  Extensive benign keratoses back chest face    Encourage sunscreen while away in [place] no other suspicious lesions observe larger nevi  Please refer to map for specific lesions.  See additional diagnoses.  Pros cons of various therapies, risks benefits discussed.Pathophysiology discussed with patient.  Therapeutic options reviewed.  See  Medications in grid.  Instructions reviewed at length.    Benign nevi, seborrheic  keratoses, cherry angiomas:  Reassurance regarding other benign skin lesions.Signs and symptoms of skin cancer, ABCDE's of melanoma discussed with patient. Sunscreen use, sun protection, self exams reviewed.  Followup as noted RTC routine checkup 6 mos - one year or p.r.n.  The patient indicates understanding of these issues and agrees to the plan.  The patient is asked to return as noted in follow-up/ above.    This note was generated using Dragon voice recognition software.  Please contact me regarding any confusion resulting from errors in recognition.  Encounter Times   Including precharting, reviewing chart, prior notes obtaining history: 10 minutes, medical exam :10 minutes, notes on body map, plan, counseling 10minutes My total time spent  caring for the patient on the day of the encounter: 30 minutes    Note to patient and family: The 21st Century Cures Act makes medical notes like these available to patients. However, be advised this is a medical document. It is intended as [id number] communication and monitoring of a patient's care needs. It is written in medical language and may contain abbreviations or verbiage that are unfamiliar. It may appear blunt or direct. Medical documents are intended to carry relevant information, facts as evident and the clinical opinion of the practitioner.

## 2024-12-02 ENCOUNTER — TELEPHONE (OUTPATIENT)
Dept: DERMATOLOGY CLINIC | Facility: CLINIC | Age: 79
End: 2024-12-02

## 2024-12-03 RX ORDER — MONTELUKAST SODIUM 10 MG/1
10 TABLET ORAL NIGHTLY
Qty: 90 TABLET | Refills: 0 | Status: SHIPPED | OUTPATIENT
Start: 2024-12-03

## 2024-12-03 RX ORDER — CALCIPOTRIENE 50 UG/G
OINTMENT TOPICAL
Qty: 60 G | Refills: 3 | Status: SHIPPED | OUTPATIENT
Start: 2024-12-03

## 2024-12-03 RX ORDER — BETAMETHASONE DIPROPIONATE 0.5 MG/G
OINTMENT, AUGMENTED TOPICAL
Qty: 50 G | Refills: 2 | Status: SHIPPED | OUTPATIENT
Start: 2024-12-03

## 2024-12-03 NOTE — TELEPHONE ENCOUNTER
This refill request is being sent to the provider for the following reason:  []Patient has not had an appointment within the past 12 months as of  LOV 1/09/2024___  []Medication is not within protocol  []Patient did not complete follow up recommendations  []Other: ___    Dr. Hernandez, pharmacy requested 90 day supply, LOV 1/09/24,  [employer] message sent to schedule appt for annual.

## 2024-12-03 NOTE — TELEPHONE ENCOUNTER
Patient called    States tube of medication will cost over $100. Is this correct?  
S/w pt - calcipotriene-betamethasone oint over $100 with [employer], pt asking is there something similar he may try?  
Sent betamethasone and calcipotriene separately will see if this is cheaper ideally would use these together okay at the same time at least once a day, these generics typically are used twice daily  
Spoke with patient. Relayed information and instructions.   
never

## 2025-01-24 ENCOUNTER — TELEPHONE (OUTPATIENT)
Dept: DERMATOLOGY CLINIC | Facility: CLINIC | Age: 80
End: 2025-01-24

## 2025-01-24 NOTE — TELEPHONE ENCOUNTER
Patient called    Asking for the name of the ointment for psoriasis. It was the combination medication. Please call     Combination of Calcipitriene and betamethasone

## 2025-03-28 ENCOUNTER — OFFICE VISIT (OUTPATIENT)
Dept: DERMATOLOGY CLINIC | Facility: CLINIC | Age: 80
End: 2025-03-28
Payer: MEDICARE

## 2025-03-28 DIAGNOSIS — D69.2 SENILE PURPURA: ICD-10-CM

## 2025-03-28 DIAGNOSIS — D23.9 BENIGN NEOPLASM OF SKIN, UNSPECIFIED LOCATION: ICD-10-CM

## 2025-03-28 DIAGNOSIS — D22.9 MULTIPLE NEVI: ICD-10-CM

## 2025-03-28 DIAGNOSIS — Z51.81 MEDICATION MONITORING ENCOUNTER: ICD-10-CM

## 2025-03-28 DIAGNOSIS — L81.4 LENTIGO: ICD-10-CM

## 2025-03-28 DIAGNOSIS — L21.8 SEBORRHEA-LIKE DERMATITIS WITH PSORIASIFORM ELEMENTS: ICD-10-CM

## 2025-03-28 DIAGNOSIS — L40.0 PSORIASIS VULGARIS: Primary | ICD-10-CM

## 2025-03-28 DIAGNOSIS — L82.1 SK (SEBORRHEIC KERATOSIS): ICD-10-CM

## 2025-03-28 PROCEDURE — 99213 OFFICE O/P EST LOW 20 MIN: CPT | Performed by: DERMATOLOGY

## 2025-03-28 PROCEDURE — G2211 COMPLEX E/M VISIT ADD ON: HCPCS | Performed by: DERMATOLOGY

## 2025-03-28 RX ORDER — AMMONIUM LACTATE 12 G/100G
CREAM TOPICAL
Qty: 385 G | Refills: 11 | COMMUNITY
Start: 2025-03-28

## 2025-03-28 RX ORDER — TRIAMCINOLONE ACETONIDE 1 MG/ML
LOTION TOPICAL
Qty: 60 ML | Refills: 5 | Status: SHIPPED | OUTPATIENT
Start: 2025-03-28

## 2025-03-28 NOTE — PROGRESS NOTES
The following individual(s) verbally consented to be recorded using ambient AI listening technology and understand that they can each withdraw their consent to this listening technology at any point by asking the clinician to turn off or pause the recording:    Patient name: Dusty Hills Jr.

## 2025-04-02 RX ORDER — MONTELUKAST SODIUM 10 MG/1
10 TABLET ORAL NIGHTLY
Qty: 30 TABLET | Refills: 0 | Status: SHIPPED | OUTPATIENT
Start: 2025-04-02

## 2025-04-02 NOTE — PROGRESS NOTES
Dusty Hills Jr. is a 79 year old male.  HPI:     CC:    Chief Complaint   Patient presents with    Derm Problem     LOV 11/30/2024. Pt present for a psoriasis flare up. Pt reports that the creams that he was prescribed have not helped. Pt states that the arms are itchy and numb, with some soreness.  Pt has a hx of Sks         Allergies:  Levaquin [levofloxacin], Penicillins, Amoxicillin, Ketorolac tromethamine, Latex, and Latex    HISTORY:    Past Medical History:    Actinic keratosis    left medial chest    Back problem    BPH (benign prostatic hyperplasia)    Calculus of kidney    Colon polyps    c-scopy     Dry eye    Ectopic kidney    left    Gout    Hematuria    sees Dr. Cabrera    Hypertrophic actinic keratosis    Left medial chest    Kidney stones    per [employer] (EMA): \"Righ pelvis w/ small kidney stones\"    Lumbar spinal stenosis    s/p lumbar laminectomy L2-L5 - Dr Hernandez @ [facility]    Osteoarthritis    Pneumonia    Pneumonia due to organism    Rheumatoid arthritis (HCC)    Rosacea    Rotator cuff tear, left    Visual impairment    readers      Past Surgical History:   Procedure Laterality Date    Back surgery      Colonoscopy      Laminectomy,>2 sgmt,lumbar  2012    L2-L5 - Dr Hernandez @ [facility]    Other surgical history  4-2015    right meniscus     Other surgical history Bilateral     shoulder    Spinal fusion      CERVICAL    Spine surgery procedure unlisted      lumbar and cervical fusion    Tonsillectomy        Family History   Problem Relation Age of Onset    Heart Disease Father 89    Breast Cancer Mother 60    Cancer Mother 87        lung    Breast Cancer Maternal Aunt     Breast Cancer Paternal Aunt     Breast Cancer Maternal Cousin Female     Breast Cancer Paternal Cousin Female     Breast Cancer Maternal Aunt     Breast Cancer Maternal Cousin Female       Social History     Socioeconomic History    Marital status:    Tobacco Use    Smoking status: Former     Current packs/day:  0.00     Average packs/day: 1 pack/day for 30.0 years (30.0 ttl pk-yrs)     Types: Cigarettes     Start date: 6/15/1959     Quit date: 6/15/1989     Years since quittin.8    Smokeless tobacco: Never   Vaping Use    Vaping status: Never Used   Substance and Sexual Activity    Alcohol use: Not Currently     Alcohol/week: 2.5 standard drinks of alcohol     Types: 3 Glasses of wine per week    Drug use: Never   Other Topics Concern    Caffeine Concern Yes     Comment: Coffee 2 cups daily    Reaction to local anesthetic No    Pt has a pacemaker No    Pt has a defibrillator No   Social History Narrative    The patient does not use an assistive device..      The patient does live in a home with stairs.     Social Drivers of Health      [facility] [employer] [employer]    [facility]        Current Outpatient Medications   Medication Sig Dispense Refill    Ammonium Lactate 12 % External Cream Apply to dry skin bid as directed 385 g 11    Emollient (MOISTURE) External Lotion Apply 1 Application topically every morning. Neutragena hydroboost gel/gel cream moisturizer 1 each 0    triamcinolone 0.1 % External Lotion Apply bid to psoriasis on scalp 60 mL 5    MONTELUKAST 10 MG Oral Tab TAKE 1 TABLET(10 MG) BY MOUTH EVERY NIGHT 90 tablet 0    Betamethasone Dipropionate Aug 0.05 % External Ointment Use bid 50 g 2    Calcipotriene 0.005 % External Ointment Apply twice daily to psoriasis 60 g 3    ketoconazole 2 % External Cream APPLY TO AFFECTED AREA 2 TIMES DAILY TO SCALY AREA ON FACE 180 g 1    Calcipotriene-Betameth Diprop 0.005-0.064 % External Ointment Apply 1 Application topically daily. To psoriasis on arms, neck, trunk 100 g 3    ketoconazole 2 % External Shampoo Apply to scalp 2 times per week. 360 mL 2    colchicine 0.6 MG Oral Tab Take 1 tablet (0.6 mg total) by mouth 2 (two) times daily. 180 tablet 3    Collagenase Does not apply Powder       azelastine 0.1 % Nasal Solution 2 sprays by Nasal route 2 (two)  times daily. 30 mL 5    colchicine 0.6 MG Oral Tab Take 1 tablet (0.6 mg total) by mouth 2 (two) times daily. 180 tablet 3    Meloxicam 7.5 MG Oral Tab Take 1 tablet (7.5 mg total) by mouth 2 (two) times daily as needed for Pain. (Patient taking differently: Take 1 tablet (7.5 mg total) by mouth as needed for Pain.) 60 tablet 5    Lifitegrast (XIIDRA) 5 % Ophthalmic Solution Place 1 drop into both eyes daily.      Vit C-Cholecalciferol-Marychuy Hip 500-1000-20 MG-UNIT-MG Oral Cap Take 500 mg by mouth daily.      Finasteride (PROSCAR OR) Take 5 mg by mouth every morning.      Vitamin D3 2000 UNITS Oral Cap Take 1 capsule (2,000 Units total) by mouth daily.      fluocinonide 0.05 % External Cream Use twice daily on affected areas on arms for itching and scaling 240 g 0     Allergies:   Allergies   Allergen Reactions    Levaquin [Levofloxacin] HIVES    Penicillins HIVES    Amoxicillin RASH    Ketorolac Tromethamine RASH    Latex ITCHING    Latex ITCHING       Past Medical History:    Actinic keratosis    left medial chest    Back problem    BPH (benign prostatic hyperplasia)    Calculus of kidney    Colon polyps    c-scopy     Dry eye    Ectopic kidney    left    Gout    Hematuria    sees Dr. Cabrera    Hypertrophic actinic keratosis    Left medial chest    Kidney stones    per [employer] (EMA): \"Righ pelvis w/ small kidney stones\"    Lumbar spinal stenosis    s/p lumbar laminectomy L2-L5 - Dr Hernandez @ [facility]    Osteoarthritis    Pneumonia    Pneumonia due to organism    Rheumatoid arthritis (HCC)    Rosacea    Rotator cuff tear, left    Visual impairment    readers     Past Surgical History:   Procedure Laterality Date    Back surgery      Colonoscopy      Laminectomy,>2 sgmt,lumbar  2012    L2-L5 - Dr Hernandez @ [facility]    Other surgical history  4-2015    right meniscus     Other surgical history Bilateral     shoulder    Spinal fusion      CERVICAL    Spine surgery procedure unlisted      lumbar and cervical  fusion    Tonsillectomy       Social History     Socioeconomic History    Marital status:      Spouse name: Not on file    Number of children: Not on file    Years of education: Not on file    Highest education level: Not on file   Occupational History    Not on file   Tobacco Use    Smoking status: Former     Current packs/day: 0.00     Average packs/day: 1 pack/day for 30.0 years (30.0 ttl pk-yrs)     Types: Cigarettes     Start date: 6/15/1959     Quit date: 6/15/1989     Years since quittin.8    Smokeless tobacco: Never   Vaping Use    Vaping status: Never Used   Substance and Sexual Activity    Alcohol use: Not Currently     Alcohol/week: 2.5 standard drinks of alcohol     Types: 3 Glasses of wine per week    Drug use: Never    Sexual activity: Not on file   Other Topics Concern     Service Not Asked    Blood Transfusions Not Asked    Caffeine Concern Yes     Comment: Coffee 2 cups daily    Occupational Exposure Not Asked    Hobby Hazards Not Asked    Sleep Concern Not Asked    Stress Concern Not Asked    Weight Concern Not Asked    Special Diet Not Asked    Back Care Not Asked    Exercise Not Asked    Bike Helmet Not Asked    Seat Belt Not Asked    Self-Exams Not Asked    Grew up on a farm Not Asked    History of tanning Not Asked    Outdoor occupation Not Asked    Reaction to local anesthetic No    Pt has a pacemaker No    Pt has a defibrillator No   Social History Narrative    The patient does not use an assistive device..      The patient does live in a home with stairs.     Social Drivers of Health     Food Insecurity: Not on file   Transportation Needs: Not on file   Stress: Not on file   Housing Stability: At Risk (10/12/2023)    Received from [employer], [facility]     Living Situation: Not on file     Housing Problems: Not on file     Family History   Problem Relation Age of Onset    Heart Disease Father 89    Breast Cancer Mother 60    Cancer Mother 87         lung    Breast Cancer Maternal Aunt     Breast Cancer Paternal Aunt     Breast Cancer Maternal Cousin Female     Breast Cancer Paternal Cousin Female     Breast Cancer Maternal Aunt     Breast Cancer Maternal Cousin Female        There were no vitals filed for this visit.    HPI:    Chief Complaint   Patient presents with    Derm Problem     LOV 11/30/2024. Pt present for a psoriasis flare up. Pt reports that the creams that he was prescribed have not helped. Pt states that the arms are itchy and numb, with some soreness.  Pt has a hx of Sks     Follow-up family history of skin cancer in his sister, no personal history of skin cancer (Stephanie) worsening lesions on the arms.  Use fluocinonide without much improvement.  Frustrated.  Does report family history of psoriasis and his son severe psoriasis, and additional family members including.  Skin    History of Present Illness  Dusty Hills Jr. is a 79 year old male with psoriasis who presents with worsening skin lesions.    He has been experiencing worsening skin lesions despite using two prescribed creams for the past four months. There is an increase in the number of marks on his skin, described as 'bleeding in the skin' and 'bleak bruises'. He suspects that the steroid creams may be exacerbating the condition. The lesions are described as little pink scaly spots that have appeared more frequently. His skin tears easily, especially during activities around the house, and he has noticed a skin tear on his arm from working in the basement.    He has a history of psoriasis, which he initially thought was the cause of his symptoms. However, the psoriasis patches have improved, and the new marks are not related to psoriasis. He also reports psoriasis on his knee and scalp, causing itching, and uses a special shampoo every few days for his scalp.    He has a history of arthritis in his hands, pseudogout, and osteoarthritis in his fingers and hands. He takes colchicine  twice a day for joint pain, which is effective. He also takes montelukast daily for chronic coughing and vitamin D supplements as advised by his doctor.    He recently traveled to [place] and tried to avoid sun exposure, as sun can worsen the itching. He joined a health club in January and goes twice a week, using the hot steam room and pool, which he suspects might irritate his skin.      Patient presents with concerns above.    Patient has been in their usual state of health.  History, medications, allergies reviewed as noted.      ROS:  Denies any other systemic complaints.  No new or changeing lesions other than noted above. No fevers, chills, night sweats, unusual sun sensitivity.  No other skin complaints.        History, medications, allergies reviewed as noted.       Physical Examination:     Well-developed well-nourished patient alert oriented in no acute distress.  Exam total-body performed, including scalp, head, neck, face,nails, hair, external eyes, including conjunctival mucosa, eyelids, lips external ears, back, chest,/ breasts, axillae,  abdomen, arms, legs, palms.     Multiple light to medium brown, well marginated, uniformly pigmented, macules and papules 6 mm and less scattered on exam. pigmented lesions examined with dermoscopy benign-appearing patterns.     Waxy tannish keratotic papules scattered, cherry-red vascular papules scattered.    See map today's date for lesions noted .    Erythema scaling over the brows temples nasolabial folds nasal sidewall  Otherwise remarkable for lesions as noted on map.  See Assessment /Plan for additional history and physical exam also:    Assessment / plan:    No orders of the defined types were placed in this encounter.      Meds & Refills for this Visit:  Requested Prescriptions     Signed Prescriptions Disp Refills    Ammonium Lactate 12 % External Cream 385 g 11     Sig: Apply to dry skin bid as directed    Emollient (MOISTURE) External Lotion 1 each 0      Sig: Apply 1 Application topically every morning. Neutragena hydroboost gel/gel cream moisturizer    triamcinolone 0.1 % External Lotion 60 mL 5     Sig: Apply bid to psoriasis on scalp         Encounter Diagnoses   Name Primary?    Psoriasis vulgaris Yes    Seborrhea-like dermatitis with psoriasiform elements     Multiple nevi     Benign neoplasm of skin, unspecified location     SK (seborrheic keratosis)     Lentigo          Physical Exam  SKIN: Psoriasis plaques on the scalp. Crusty scaly plaque on the knee, consistent with psoriasis. Sun freckles on the neck.    Results      Fall risk assessment:  Given the results of the fall risk questionnaire given today.   Suggest:   Make sure there is adequate lighting.  Eliminate throw rugs or possible sources of tripping & falling  Consider grab bars on the shower & toilet.  Hand rails on all stair cases  Ambulatory assistance devices such as cane or walker as indicate.  To ensure home safety measures.    Patient seen for follow-up long-term monitoring, treatment of  Psoriasis family history of skin cancer medication monitoring arthritis  Plan of care:  ongoing surveillance, monitoring including regular follow-up due to longer term risk of recurrence, new lesions.  See previous notes.  There is a longitudinal care relationship with me, the care plan reflects the ongoing nature of the continuous relationship of care, and the medical record indicates that there is ongoing treatment of a serious/complex medical condition which I am currently managing.  is Applicable      Assessment & Plan  Senile Purpura  Senile purpura is characterized by bleeding into the skin, exacerbated by age, sun exposure, scratching, and use of steroid creams. Meloxicam and colchicine worsen the condition. Steroid creams have thinned the skin, leading to increased bruising. Psoriasis is well-controlled and unrelated to this condition. Sun exposure and scratching further aggravate senile purpura.  Moisturizers with lactic acid can help build collagen and reduce skin tears.  - Discontinue steroid creams.  - Use daily moisturizers such as CeraVe or Amlactin to build collagen and reduce skin tears.  - Apply sunscreen with SPF 50 to prevent sun-induced exacerbation.  - Avoid scratching or rubbing the skin.  - Consider moisturizers with lactic acid to smooth the skin and build collagen.    Psoriasis  Psoriasis is well-controlled with minimal pinkness at previous lesion sites. A crusty scaly plaque on the knee is identified as psoriasis. The condition is unrelated to current skin discoloration issues. Steroid cream is used on the knee plaque until it flattens. Moisturizers with hyaluronic acid and lactic acid can help manage the condition.  - Use prescribed steroid cream on the knee plaque until it flattens, then discontinue.  - Consider CeraVe psoriasis cream for affected areas.  - Use a liquid treatment for scalp psoriasis as needed, applying once or twice daily depending on severity.  - Use moisturizers with hyaluronic acid and lactic acid to manage psoriasis and build skin resilience.  May continue triamcinolone which has been helpful await response does have betamethasone and calcipotriene at home concern regarding cost.  Remains on colchicine takes this daily continue careful monitoring potential interactions with systemic medications consider additional meds such as Otezla if not improving    Psoriatic Arthritis  Joint pain and burning sensations in the hands may be related to psoriatic arthritis. Pseudogout and osteoarthritis complicate the diagnosis. Colchicine is used for joint pain management but may contribute to skin issues.  - Continue colchicine twice daily for joint pain management.  - Monitor joint symptoms and consider further evaluation if symptoms persist or worsen.    Recording duration: 14 minutes      Verrucoid keratosis from left medial central chest 1124 healed well    Worsening psoriasis  plaques over the elbows forearms bilateral, scattered patches over the back, abdomen, persistent psoriasiform scale over the face, minimal occipital scalp.    Psoriasis.  Plaque-type.  10% body surface involvement.  Will treat with medications and grid.  Overall skincare, liberal use of emollients discussed.  Consider more aggressive therapy if worsening.Patient will let us know how they are doing over the next several weeks.  Await clinical response to above therapy.  Recheck in 2-3 months if no improvement.  Taclonex ointment daily.  Consider alternatives if not covered.  Patient will be in [place] over the winter recheck on following up in the spring consider systemic treatments at this point in time we will proceed with topicals.  Given family history more severe psoriasis consider additional options.  Labs reviewed mild elevation AST ALT normal creatinine normal A1c 5.7 thyroid normal CBC normal    Patient has gained weight recently is working on weight loss.  This may help as well.  Sun exposure, warmer weather may be helpful.      Erythematous scaling patches over the cheeks brows and chin  Seborrheic dermatitis, rosacea overlap.  Pathophysiology discussed with patient.  Therapeutic options reviewed.  See  Medications in grid.  Instructions reviewed at length..Rosacea.  Meds in grid.  Skin care instructions reviewed.  Pathophysiology reviewed.  Chronic recurrent nature discussed.  Patient will let us know how they are doing over the next several weeks.  Await clinical response to above therapy.  Ketoconazole cream to face  Consider additional topicals if worsening  Ketoconazole has been helpful    Large nevi left upper back posterior shoulder.  Multiple keratoses over mid back.  Benign keratosis at left brow reassurance.  Extensive benign keratoses back chest face    Encourage sunscreen while away in [place] no other suspicious lesions observe larger nevi  Please refer to map for specific lesions.  See  additional diagnoses.  Pros cons of various therapies, risks benefits discussed.Pathophysiology discussed with patient.  Therapeutic options reviewed.  See  Medications in grid.  Instructions reviewed at length.    Benign nevi, seborrheic  keratoses, cherry angiomas:  Reassurance regarding other benign skin lesions.Signs and symptoms of skin cancer, ABCDE's of melanoma discussed with patient. Sunscreen use, sun protection, self exams reviewed.  Followup as noted RTC routine checkup 6 mos - one year or p.r.n.  The patient indicates understanding of these issues and agrees to the plan.  The patient is asked to return as noted in follow-up/ above.    This note was generated using Dragon voice recognition software.  Please contact me regarding any confusion resulting from errors in recognition.  Encounter Times   Including precharting, reviewing chart, prior notes obtaining history: 10 minutes, medical exam :10 minutes, notes on body map, plan, counseling 10minutes My total time spent caring for the patient on the day of the encounter: 30 minutes    Note to patient and family: The 21st Century Cures Act makes medical notes like these available to patients. However, be advised this is a medical document. It is intended as [id number] communication and monitoring of a patient's care needs. It is written in medical language and may contain abbreviations or verbiage that are unfamiliar. It may appear blunt or direct. Medical documents are intended to carry relevant information, facts as evident and the clinical opinion of the practitioner.

## 2025-04-02 NOTE — TELEPHONE ENCOUNTER
This refill request is being sent to the provider for the following reason:  [x]Patient has not had an appointment within the past 12 months but has made an appointment on: ___my chart message sent to patient advising to schedule office visit   []Medication is not within protocol  []Patient did not complete follow up recommendations  []Other: ___

## 2025-04-03 RX ORDER — MONTELUKAST SODIUM 10 MG/1
10 TABLET ORAL NIGHTLY
Qty: 90 TABLET | Refills: 0 | OUTPATIENT
Start: 2025-04-03

## 2025-05-29 ENCOUNTER — OFFICE VISIT (OUTPATIENT)
Age: 80
End: 2025-05-29
Payer: MEDICARE

## 2025-05-29 VITALS — WEIGHT: 250 LBS | HEIGHT: 65 IN | BODY MASS INDEX: 41.65 KG/M2

## 2025-05-29 DIAGNOSIS — M11.20 CHONDROCALCINOSIS: ICD-10-CM

## 2025-05-29 DIAGNOSIS — M17.11 PRIMARY OSTEOARTHRITIS OF RIGHT KNEE: Primary | ICD-10-CM

## 2025-05-29 PROCEDURE — 20610 DRAIN/INJ JOINT/BURSA W/O US: CPT | Performed by: ORTHOPAEDIC SURGERY

## 2025-05-29 PROCEDURE — 99213 OFFICE O/P EST LOW 20 MIN: CPT | Performed by: ORTHOPAEDIC SURGERY

## 2025-05-29 NOTE — PROGRESS NOTES
Chief Complaint: Right knee Pain    History of Present Illness:  Dusty Hills Jr. is a 79 year old male with a 1 month history of right knee pain and swelling.   [place] was [place] and [place] fall.  He had pain in swelling.  He went to the local orthopedist.  His knee drained and had a cortisone injection.  He states that his knee was not completely drained and was told that the cortisone injection would remove the remaining fluid.  This never improved.  He is back locally and is inquiring about getting his right knee drained.  Currently complains of pain in the knee.  Notes with weightbearing activities, and bending the knee.  He has trouble when he first gets up.  Denies any numbness or tingling.  Denies any mechanical symptoms or instability symptoms.      Allergies[1]    Past Surgical History[2]    Past Medical History[3]    Current Medications[4]    Social History     Occupational History    Not on file   Tobacco Use    Smoking status: Former     Current packs/day: 0.00     Average packs/day: 1 pack/day for 30.0 years (30.0 ttl pk-yrs)     Types: Cigarettes     Start date: 6/15/1959     Quit date: 6/15/1989     Years since quittin.9    Smokeless tobacco: Never   Vaping Use    Vaping status: Never Used   Substance and Sexual Activity    Alcohol use: Not Currently     Alcohol/week: 2.5 standard drinks of alcohol     Types: 3 Glasses of wine per week    Drug use: Never    Sexual activity: Not on file       Family History[5]    Family Status   Relation Status    Fa     Mo     Mat Aunt Other        diagnosed in early 30s    Pat Aunt (Not Specified)    Mat Cous Fem Other        diagnosed in late 60s    Pat Cous Fem Other        diagnosed in late 60s    Mat Aunt Other        diagnosed in 60s    Mat Cous Fem Other        diagnosed in early 70s       REVIEW OF SYSTEMS  A 12 point review of systems was conducted, pertinent only to the HPI and history noted  above.    Constitutional: Negative, except as mentioned in above history  Eyes, Ears, Nose, Throat: Negative, except as mentioned in above history  Cardiovascular: Negative, except as mentioned in above history  Respiratory: Negative, except as mentioned in above history  GI: Negative, except as mentioned in above history  : Negative, except as mentioned in above history  Skin: Negative, except as mentioned in above history  Neuro: Negative, except as mentioned in above history  Endocrine/Heme/Lymph: Negative, except as mentioned in above history  Immunologic: Negative, except as mentioned in above history  Psychiatric: Negative, except as mentioned in above history  Musculoskeletal: See HPI    PHYSICAL EXAM  Vitals:    25 1324   Weight: 250 lb (113.4 kg)   Height: 5' 5\" (1.651 m)       Body mass index is 41.6 kg/m².    Constitutional: Well appearing male in no apparent distress.    Psych: Mood and affect are appropriate for the situation. Alert and Oriented x 3.  Eyes: Sclera anicteric  HENT: Hearing appropriate for normal conversation  Neck: appears symmetric with no gross thyromegaly  Pulm: No labored breathing, no wheezing  CVS: Regular rate and rhythm  Skin: No rashes, erythema, or induration  Gait: Antalgic to the right  right Knee        Alignment: Neutral, obvious deformity       Skin: Intact       Swelling: Mild   Effusion: Positive   Tenderness: Medial joint line, Lateral joint line, and Lateral retinaculum       Range of Motion:      Extension: 0     Flexion: 100     Flexion Contracture: 0     Extensor La       McMurrays: Negative   Lachmann: Negative   Anterior Drawer: Negative   Posterior Drawer: Negative   Varus Stress Test: stable   Valgus Stress Test: stable       Patellar Tracking: Centrally   Patellar Apprehension: No   Extensor Mechanism: Intact       Hip ROM:   Intact       Sensation: Intact distally   Motor: Intact ankle and toe extension and flexion   Vascular: Palpable pulse      Imaging and Special Studies:  I independently reviewed the patient's relevant imaging studies today.    4 views of the right knee from the urgent care visit were available for review.  They demonstrate no acute fracture description.  Mild to moderate degenerative changes with joint space narrowing, osteophyte formation, sclerosis are noted.  Chondrocalcinosis is present.    Impression:  Assessment & Plan  Primary osteoarthritis of right knee         Chondrocalcinosis           Plan:  I had a lengthy discussion today about the patient's knee.  Discussed his diagnosis of CPPD. He would like another aspiration today.  Unfortunately, he cannot get a cortisone injection as it is too soon.  We can go ahead and do aspiration however.  If it is not better in another 6 weeks, I recommend reevaluation by his rheumatologist.  Specifically, I recommend the following:    Rest, ice, compression, elevation, anti-inflammatories/Tylenol for symptoms.  Patient went to right knee aspiration yielding 30 cc of demarco joint fluid.  Activities: As tolerated.  Follow up: As needed  The patient understands and agrees with this plan. All of the patient's questions were answered today.    Risks and benefits of knee aspiration discussed with the patient, with risks including but not limited to pain and swelling at the aspiration site and/or within the knee joint, infection. After informed consent, the patient's right knee was marked and prepped with antiseptic solution.  It was reprepped with antiseptic solution, and an 18-gauge needle was inserted through the superolateral portal site, into the knee joint deep to the patella.  Aspiration was performed and returned 30 cc of normal-appearing joint fluid.  A band-aid was applied.  The patient tolerated the procedure well.      BMI is above average; BMI management plan is completed    Frida Monteiro MD         [1]   Allergies  Allergen Reactions    Levaquin [Levofloxacin] HIVES     Penicillins HIVES    Amoxicillin RASH    Ketorolac Tromethamine RASH    Latex ITCHING    Latex ITCHING   [2]   Past Surgical History:  Procedure Laterality Date    Back surgery      Colonoscopy      Laminectomy,>2 sgmt,lumbar  2012    L2-L5 - Dr Hernandez @ [facility]    Other surgical history  4-2015    right meniscus     Other surgical history Bilateral     shoulder    Spinal fusion      CERVICAL    Spine surgery procedure unlisted      lumbar and cervical fusion    Tonsillectomy     [3]   Past Medical History:   Actinic keratosis    left medial chest    Back problem    BPH (benign prostatic hyperplasia)    Calculus of kidney    Colon polyps    c-scopy     Dry eye    Ectopic kidney    left    Gout    Hematuria    sees Dr. Cabrera    Hypertrophic actinic keratosis    Left medial chest    Kidney stones    per [employer] ([facility]): \"Righ pelvis w/ small kidney stones\"    Lumbar spinal stenosis    s/p lumbar laminectomy L2-L5 - Dr Hernandez @ [facility]    Osteoarthritis    Pneumonia    Pneumonia due to organism    Rheumatoid arthritis (HCC)    Rosacea    Rotator cuff tear, left    Visual impairment    readers   [4]   Current Outpatient Medications   Medication Sig Dispense Refill    montelukast 10 MG Oral Tab Take 1 tablet (10 mg total) by mouth nightly. 30 tablet 0    Ammonium Lactate 12 % External Cream Apply to dry skin bid as directed 385 g 11    Emollient (MOISTURE) External Lotion Apply 1 Application topically every morning. Neutragena hydroboost gel/gel cream moisturizer 1 each 0    triamcinolone 0.1 % External Lotion Apply bid to psoriasis on scalp 60 mL 5    Betamethasone Dipropionate Aug 0.05 % External Ointment Use bid 50 g 2    Calcipotriene 0.005 % External Ointment Apply twice daily to psoriasis 60 g 3    ketoconazole 2 % External Cream APPLY TO AFFECTED AREA 2 TIMES DAILY TO SCALY AREA ON FACE 180 g 1    Calcipotriene-Betameth Diprop 0.005-0.064 % External Ointment Apply 1 Application topically daily. To  psoriasis on arms, neck, trunk 100 g 3    ketoconazole 2 % External Shampoo Apply to scalp 2 times per week. 360 mL 2    colchicine 0.6 MG Oral Tab Take 1 tablet (0.6 mg total) by mouth 2 (two) times daily. 180 tablet 3    Collagenase Does not apply Powder       colchicine 0.6 MG Oral Tab Take 1 tablet (0.6 mg total) by mouth 2 (two) times daily. 180 tablet 3    Lifitegrast (XIIDRA) 5 % Ophthalmic Solution Place 1 drop into both eyes daily.      Vit C-Cholecalciferol-Marychuy Hip 500-1000-20 MG-UNIT-MG Oral Cap Take 500 mg by mouth daily.      Finasteride (PROSCAR OR) Take 5 mg by mouth every morning.      Vitamin D3 2000 UNITS Oral Cap Take 1 capsule (2,000 Units total) by mouth daily.      fluocinonide 0.05 % External Cream Use twice daily on affected areas on arms for itching and scaling 240 g 0    azelastine 0.1 % Nasal Solution 2 sprays by Nasal route 2 (two) times daily. 30 mL 5    Meloxicam 7.5 MG Oral Tab Take 1 tablet (7.5 mg total) by mouth 2 (two) times daily as needed for Pain. (Patient taking differently: Take 1 tablet (7.5 mg total) by mouth as needed for Pain.) 60 tablet 5   [5]   Family History  Problem Relation Age of Onset    Heart Disease Father 89    Breast Cancer Mother 60    Cancer Mother 87        lung    Breast Cancer Maternal Aunt     Breast Cancer Paternal Aunt     Breast Cancer Maternal Cousin Female     Breast Cancer Paternal Cousin Female     Breast Cancer Maternal Aunt     Breast Cancer Maternal Cousin Female

## 2025-06-09 ENCOUNTER — LAB ENCOUNTER (OUTPATIENT)
Dept: LAB | Age: 80
End: 2025-06-09
Attending: INTERNAL MEDICINE
Payer: MEDICARE

## 2025-06-09 DIAGNOSIS — R73.01 IMPAIRED FASTING GLUCOSE: ICD-10-CM

## 2025-06-09 DIAGNOSIS — N13.9 OBSTRUCTIVE UROPATHY: Primary | ICD-10-CM

## 2025-06-09 DIAGNOSIS — E78.00 PURE HYPERCHOLESTEROLEMIA: ICD-10-CM

## 2025-06-09 DIAGNOSIS — N13.9 OBSTRUCTED, UROPATHY: ICD-10-CM

## 2025-06-09 DIAGNOSIS — Z12.5 SPECIAL SCREENING FOR MALIGNANT NEOPLASM OF PROSTATE: ICD-10-CM

## 2025-06-09 LAB
ALBUMIN SERPL-MCNC: 4.4 G/DL (ref 3.2–4.8)
ALBUMIN/GLOB SERPL: 1.9 {RATIO} (ref 1–2)
ALP LIVER SERPL-CCNC: 104 U/L (ref 45–117)
ALT SERPL-CCNC: 38 U/L (ref 10–49)
ANION GAP SERPL CALC-SCNC: 10 MMOL/L (ref 0–18)
AST SERPL-CCNC: 31 U/L (ref ?–34)
BASOPHILS # BLD AUTO: 0.06 X10(3) UL (ref 0–0.2)
BASOPHILS NFR BLD AUTO: 0.7 %
BILIRUB SERPL-MCNC: 0.6 MG/DL (ref 0.2–1.1)
BUN BLD-MCNC: 24 MG/DL (ref 9–23)
BUN/CREAT SERPL: 20 (ref 10–20)
CALCIUM BLD-MCNC: 9.4 MG/DL (ref 8.7–10.4)
CHLORIDE SERPL-SCNC: 104 MMOL/L (ref 98–112)
CHOLEST SERPL-MCNC: 180 MG/DL (ref ?–200)
CO2 SERPL-SCNC: 27 MMOL/L (ref 21–32)
COMPLEXED PSA SERPL-MCNC: 5.56 NG/ML (ref ?–4)
CREAT BLD-MCNC: 1.2 MG/DL (ref 0.7–1.3)
DEPRECATED RDW RBC AUTO: 47.4 FL (ref 35.1–46.3)
EGFRCR SERPLBLD CKD-EPI 2021: 62 ML/MIN/1.73M2 (ref 60–?)
EOSINOPHIL # BLD AUTO: 0.17 X10(3) UL (ref 0–0.7)
EOSINOPHIL NFR BLD AUTO: 2 %
ERYTHROCYTE [DISTWIDTH] IN BLOOD BY AUTOMATED COUNT: 14 % (ref 11–15)
EST. AVERAGE GLUCOSE BLD GHB EST-MCNC: 120 MG/DL (ref 68–126)
FASTING PATIENT LIPID ANSWER: YES
FASTING STATUS PATIENT QL REPORTED: YES
GLOBULIN PLAS-MCNC: 2.3 G/DL (ref 2–3.5)
GLUCOSE BLD-MCNC: 93 MG/DL (ref 70–99)
HBA1C MFR BLD: 5.8 % (ref ?–5.7)
HCT VFR BLD AUTO: 49 % (ref 39–53)
HDLC SERPL-MCNC: 63 MG/DL (ref 40–59)
HGB BLD-MCNC: 15.6 G/DL (ref 13–17.5)
IMM GRANULOCYTES # BLD AUTO: 0.02 X10(3) UL (ref 0–1)
IMM GRANULOCYTES NFR BLD: 0.2 %
LDLC SERPL CALC-MCNC: 104 MG/DL (ref ?–100)
LYMPHOCYTES # BLD AUTO: 3.3 X10(3) UL (ref 1–4)
LYMPHOCYTES NFR BLD AUTO: 39.7 %
MCH RBC QN AUTO: 29.1 PG (ref 26–34)
MCHC RBC AUTO-ENTMCNC: 31.8 G/DL (ref 31–37)
MCV RBC AUTO: 91.2 FL (ref 80–100)
MONOCYTES # BLD AUTO: 0.6 X10(3) UL (ref 0.1–1)
MONOCYTES NFR BLD AUTO: 7.2 %
NEUTROPHILS # BLD AUTO: 4.16 X10 (3) UL (ref 1.5–7.7)
NEUTROPHILS # BLD AUTO: 4.16 X10(3) UL (ref 1.5–7.7)
NEUTROPHILS NFR BLD AUTO: 50.2 %
NONHDLC SERPL-MCNC: 117 MG/DL (ref ?–130)
OSMOLALITY SERPL CALC.SUM OF ELEC: 296 MOSM/KG (ref 275–295)
PLATELET # BLD AUTO: 207 10(3)UL (ref 150–450)
POTASSIUM SERPL-SCNC: 4.2 MMOL/L (ref 3.5–5.1)
PROT SERPL-MCNC: 6.7 G/DL (ref 5.7–8.2)
RBC # BLD AUTO: 5.37 X10(6)UL (ref 3.8–5.8)
SODIUM SERPL-SCNC: 141 MMOL/L (ref 136–145)
TRIGL SERPL-MCNC: 69 MG/DL (ref 30–149)
TSI SER-ACNC: 3.38 UIU/ML (ref 0.55–4.78)
VLDLC SERPL CALC-MCNC: 12 MG/DL (ref 0–30)
WBC # BLD AUTO: 8.3 X10(3) UL (ref 4–11)

## 2025-06-09 PROCEDURE — 36415 COLL VENOUS BLD VENIPUNCTURE: CPT

## 2025-06-09 PROCEDURE — 80053 COMPREHEN METABOLIC PANEL: CPT

## 2025-06-09 PROCEDURE — 80061 LIPID PANEL: CPT

## 2025-06-09 PROCEDURE — 84443 ASSAY THYROID STIM HORMONE: CPT

## 2025-06-09 PROCEDURE — 83036 HEMOGLOBIN GLYCOSYLATED A1C: CPT

## 2025-06-09 PROCEDURE — 85025 COMPLETE CBC W/AUTO DIFF WBC: CPT

## 2025-07-22 ENCOUNTER — OFFICE VISIT (OUTPATIENT)
Dept: RHEUMATOLOGY | Facility: CLINIC | Age: 80
End: 2025-07-22
Payer: MEDICARE

## 2025-07-22 VITALS
TEMPERATURE: 98 F | SYSTOLIC BLOOD PRESSURE: 142 MMHG | OXYGEN SATURATION: 94 % | BODY MASS INDEX: 42.32 KG/M2 | HEIGHT: 65 IN | RESPIRATION RATE: 16 BRPM | WEIGHT: 254 LBS | HEART RATE: 66 BPM | DIASTOLIC BLOOD PRESSURE: 62 MMHG

## 2025-07-22 DIAGNOSIS — M25.461 EFFUSION OF RIGHT KNEE: Primary | ICD-10-CM

## 2025-07-22 DIAGNOSIS — M11.261 CHONDROCALCINOSIS OF BOTH KNEES: ICD-10-CM

## 2025-07-22 DIAGNOSIS — M11.262 CHONDROCALCINOSIS OF BOTH KNEES: ICD-10-CM

## 2025-07-22 DIAGNOSIS — Z87.39 H/O CALCIUM PYROPHOSPHATE DEPOSITION DISEASE (CPPD): ICD-10-CM

## 2025-07-22 DIAGNOSIS — M17.11 PRIMARY OSTEOARTHRITIS OF RIGHT KNEE: ICD-10-CM

## 2025-07-22 PROCEDURE — 99214 OFFICE O/P EST MOD 30 MIN: CPT | Performed by: INTERNAL MEDICINE

## 2025-07-22 RX ORDER — COLCHICINE 0.6 MG/1
0.6 TABLET ORAL 2 TIMES DAILY
Qty: 180 TABLET | Refills: 3 | Status: SHIPPED | OUTPATIENT
Start: 2025-07-22 | End: 2025-07-22

## 2025-07-22 RX ORDER — COLCHICINE 0.6 MG/1
0.6 TABLET ORAL 2 TIMES DAILY
Qty: 180 TABLET | Refills: 3 | Status: SHIPPED | OUTPATIENT
Start: 2025-07-22 | End: 2025-07-25

## 2025-07-22 NOTE — PROGRESS NOTES
[facility]  Dr. Guzman Progress Note     Subjective:   Dusty Hills Jr. is a(n) 79 year old male.   Current complaints:   Chief Complaint   Patient presents with    Follow - Up     LOV: 7/16/24  Patient states that his pain hasn't been that bad. Stiffness in wrists and up his arms, pain in knees (seeing ortho) (fell on knee, and has been having trouble with it ever since).   Rapid 3: 2.3   History of pseudogout and chondrocalcinosis of both knees.  Also history of lumbar spinal stenosis, osteoarthritis of the hands, cervical spine fusion, and laminectomy of the lumbar spine.  [place] in [place], [place].  Had right knee drained.  On colchicine.  Got bit by something a week and half ago.  Allergic to penicillin... Was given clindamycin, which caused diarrhea.  Both wrists stiff,   S/p 2 cervical fusions. C2/C3/C4/C5 fusion last one done.  Objective:   /62   Pulse 66   Temp 97.7 °F (36.5 °C)   Resp 16   Ht 5' 5\" (1.651 m)   Wt 254 lb (115.2 kg)   SpO2 94%   BMI 42.27 kg/m²   Right knee tender trace effusion  Walks with a limp.   Left knee non tender    Lungs clear  Heart nsr   Assessment:     Encounter Diagnoses   Name Primary?    Effusion of right knee Yes    Primary osteoarthritis of right knee     Chondrocalcinosis of both knees     H/O calcium pyrophosphate deposition disease (CPPD)      Plan:     Patient Instructions   Continue colchicine 0.6 mg - one twice a day.  ES Tylenol 500 mg - 1-2 as needed.  See orthopedic surgeon cortisone shot and or gel shot in the right knee.  If fluid is drained send for crystals.   Return to office  one year.         Jani Guzman MD 7/22/2025 11:23 AM

## 2025-07-22 NOTE — PATIENT INSTRUCTIONS
Continue colchicine 0.6 mg - one twice a day.  ES Tylenol 500 mg - 1-2 as needed.  See orthopedic surgeon cortisone shot and or gel shot in the right knee.  If fluid is drained send for crystals.   Return to office  one year.

## 2025-07-25 ENCOUNTER — TELEPHONE (OUTPATIENT)
Facility: CLINIC | Age: 80
End: 2025-07-25

## 2025-07-25 RX ORDER — COLCHICINE 0.6 MG/1
0.6 TABLET ORAL 2 TIMES DAILY
Qty: 180 TABLET | Refills: 3 | Status: SHIPPED | OUTPATIENT
Start: 2025-07-25

## 2025-07-25 NOTE — TELEPHONE ENCOUNTER
Pt needs the colchicine needs to go [facility] or [facility] please can this be done today?

## (undated) DEVICE — CATH URET CONE TIP 8FR 138008

## (undated) DEVICE — SOL H2O 3000ML IRRIG

## (undated) DEVICE — CYSTO PACK: Brand: MEDLINE INDUSTRIES, INC.

## (undated) DEVICE — SOL  .9 1000ML BTL

## (undated) DEVICE — ISOVUE 300 10X100ML VIAL

## (undated) DEVICE — UROLOGY DRAIN BAG

## (undated) DEVICE — ENCORE® LATEX ACCLAIM SIZE 8.5, STERILE LATEX POWDER-FREE SURGICAL GLOVE: Brand: ENCORE

## (undated) NOTE — LETTER
[address]  [address] [address]  [phone]        Dear Nivia Peña had the pleasure of seeing your patient, Moore Ramesh. on 5/16/2022. Below please find a summary of our visit. If you have any concerns or questions, please feel free to give me a call to discuss.     Sincerely,  Shania Lin MD, MD     No notes on file

## (undated) NOTE — Clinical Note
Dear Tabitha Landis,    I had the opportunity to see your patient Dayna Cuello. recently. I am sending you this update, and I appreciate your confidence in me to care for your patients.  Please feel free call me with any questions at [phone] or contact me

## (undated) NOTE — LETTER
[address]   Date:   7/23/2021     Name:   Dayna Cuello. YOB: 1945   MRN:   [id number]       WHERE IS YOUR PAIN NOW? Pasha the areas on your body where you feel the described sensations.   Use the appropriat

## (undated) NOTE — Clinical Note
Dear Vira Yi,    I had the opportunity to see your patient Phillip Neal. recently. I am sending you this update, and I appreciate your confidence in me to care for your patients.  Please feel free call me with any questions at [phone] or contact me

## (undated) NOTE — LETTER
[address]  [facility]  [phone]        Dear Kayden Sapp,      I had the pleasure of seeing your patient, Jin Griffin. on 3/17/2022. Below please find a summary of our visit. If you have any concerns or questions, please feel free to give me a call to discuss. Sincerely,  Kacey Rome MD, MD     Mr. James Correia, relates a several year history of bilateral ulnar forearm pain, paresthesias in the hands, ulnar greater than median distribution. Left rotator cuff dysfunction. Long history of cervical spine pain. Denies any radiating pain between the shoulder and the elbows. Denies lower extremity weakness. No bowel bladder dysfunction. He has been evaluated by rheumatologist, Dr. Emani Hernandez who ordered MRI of the cervical spine last year. He was recently evaluated by neurosurgeon at [facility] and is scheduled to follow-up with his neurosurgeon. Is also been diagnosed with pseudogout. I reviewed the MRI cervical spine images with the patient. He has had cervical, lumbar spine surgery. Epic records, labs, physicians notes reviewed    Review of system -12 fields except for arthralgias. Exam: Pupils react to light accommodation. Optic disc flat. Visual fields are full. Cranial nerves normal.  Speech and language intact. Strength in the upper, lower extremities is normal except for bilateral intrinsic hand weakness right cranial left. Deep tendon reflexes +1 symmetric in upper extremities +2 symmetric in the lower extremities without Babinski signs. No ankle clonus. Tone in the arms and legs normal.  Joint position sense vibration in the hands and feet are normal.  Finger-nose normal.  Gait is normal.    Impression: Cervical myelopathy. Will obtain an EMG of bilateral UE to evaluate associate carpal tunnel syndrome, ulnar neuropathy.   Weakness is maximum in the intrinsic ulnar muscles right greater than left. He states he will call the neurosurgery Monday if he is not heard from them. Neurosurgeon is waiting to review MRI of the cervical spine images. Although await for the EMG nerve conduction study, my clinical impression is that he will need to proceed with C-spine decompressive surgery.

## (undated) NOTE — LETTER
[address]   Date:   1/22/2021     Name:   Karin Yang    YOB: 1945   MRN:   [id number]       WHERE IS YOUR PAIN NOW? Pasha the areas on your body where you feel the described sensations.   Use the appropriate

## (undated) NOTE — LETTER
AUTHORIZATION FOR SURGICAL OPERATION OR OTHER PROCEDURE    1. I hereby authorize Dr. Monteiro, and [facility] staff assigned to my case to perform the following operation and/or procedure at the [facility] site:    Right knee aspiration   _______________________________________________________________________________________________      _______________________________________________________________________________________________    2.  My physician has explained the nature and purpose of the operation or other procedure, possible alternative methods of treatment, the risks involved, and the possibility of complication to me.  I acknowledge that no guarantee has been made as to the result that may be obtained.  3.  I recognize that, during the course of this operation, or other procedure, unforseen conditions may necessitate additional or different procedure than those listed above.  I, therefore, further authorize and request that the above named physician, his/her physician assistants or designees perform such procedures as are, in his/her professional opinion, necessary and desirable.  4.  Any tissue or organs removed in the operation or other procedure may be disposed of by and at the discretion of the [facility] and [facility].  5.  I understand that in the event of a medical emergency, I will be transported by local paramedics to [facility] or other hospital emergency department.  6.  I certify that I have read and fully understand the above consent to operation and/or other procedure.    7.  I acknowledge that my physician has explained sedation/analgesia administration to me including the risks and benefits.  I consent to the administration of sedation/analgesia as may be necessary or desirable in the judgement of my physician.    Witness signature: ___________________________________________________ Date:  ______/______/_____                     Time:  ________ A.M.  P.M.       Patient Name:  ______________________________________________________  (please print)      Patient signature:  ___________________________________________________             Relationship to Patient:           []  Parent    Responsible person                          []  Spouse  In case of minor or                    [] Other  _____________   Incompetent name:  __________________________________________________                               (please print)      _____________      Responsible person  In case of minor or  Incompetent signature:  _______________________________________________    Statement of Physician  My signature below affirms that prior to the time of the procedure, I have explained to the patient and/or his/her guardian, the risks and benefits involved in the proposed treatment and any reasonable alternative to the proposed treatment.  I have also explained the risks and benefits involved in the refusal of the proposed treatment and have answered the patient's questions.                        Date:  ______/______/_______  Provider                      Signature:  __________________________________________________________       Time:  ___________ A.M    P.M.

## (undated) NOTE — LETTER
[address] [address]   Date:   6/7/2021     Name:   Linette Noonan. YOB: 1945   MRN:   [id number]       WHERE IS YOUR PAIN NOW?   Pasha the areas on your body where you feel the described sensations